# Patient Record
Sex: MALE | Race: BLACK OR AFRICAN AMERICAN | Employment: OTHER | URBAN - METROPOLITAN AREA
[De-identification: names, ages, dates, MRNs, and addresses within clinical notes are randomized per-mention and may not be internally consistent; named-entity substitution may affect disease eponyms.]

---

## 2018-12-14 ENCOUNTER — OFFICE VISIT (OUTPATIENT)
Dept: NEUROLOGY | Age: 78
End: 2018-12-14

## 2018-12-14 VITALS
TEMPERATURE: 97.8 F | OXYGEN SATURATION: 98 % | HEIGHT: 72 IN | HEART RATE: 51 BPM | RESPIRATION RATE: 16 BRPM | BODY MASS INDEX: 27.77 KG/M2 | SYSTOLIC BLOOD PRESSURE: 100 MMHG | WEIGHT: 205 LBS | DIASTOLIC BLOOD PRESSURE: 60 MMHG

## 2018-12-14 DIAGNOSIS — G47.10 HYPERSOMNIA: Primary | ICD-10-CM

## 2018-12-14 DIAGNOSIS — G47.00 INSOMNIA, UNSPECIFIED TYPE: ICD-10-CM

## 2018-12-14 NOTE — PROGRESS NOTES
HPI:  70 y/o male coming for sleep problems. For years he has felt very sleepy during the day. He has variable sleep times, varies from 10:30p-12:30a, fluctuates for a couple of hrs. He tosses and turns throughout the night. He is up in am by 9am. He does not feel rested, and he tries not to nap, but can fall asleep anywhere. He does not know if he snores. He was on namenda, reportedly for off label tx of glaucoma, because it helps with nerves. He denies probs with memory. He does not smoke, occasionally drinks. He does watch TV in bed, does not eat close to bedtime. Social History     Socioeconomic History    Marital status:      Spouse name: Not on file    Number of children: Not on file    Years of education: Not on file    Highest education level: Not on file   Social Needs    Financial resource strain: Not on file    Food insecurity - worry: Not on file    Food insecurity - inability: Not on file    Transportation needs - medical: Not on file   [employer] needs - non-medical: Not on file   Occupational History    Not on file   Tobacco Use    Smoking status: Former Smoker    Smokeless tobacco: Never Used   Substance and Sexual Activity    Alcohol use: Yes    Drug use: Not on file    Sexual activity: Not on file   Other Topics Concern    Not on file   Social History Narrative    Not on file       Family History   Problem Relation Age of Onset    Breast Cancer Mother     Cancer Father     Cancer Sister         hodgkins       Current Outpatient Medications   Medication Sig Dispense Refill    sildenafil, antihypertensive, (REVATIO) 20 mg tablet Take 1-5 tabs by mouth one hour prior to sexual activity 90 Tab 2    sildenafil, antihypertensive, (REVATIO) 20 mg tablet TAKE 1 TAB BY MOUTH AS NEEDED.  TAKE 1-5 TABS AS DIRECTED INDICATIONS: [facility] 40 Tab 0    KLOR-CON M10 10 mEq tablet TAKE 1 TABLET BY MOUTH EVERY DAY  3    tamsulosin (FLOMAX) 0.4 mg capsule Take 1 Cap by mouth daily. 90 Cap 3    tadalafil (CIALIS) 5 mg tablet Take 1 Tab by mouth daily. Indications: Erectile Dysfunction, SYMPTOMATIC BENIGN PROSTATIC HYPERPLASIA 30 Tab 6    NAMENDA XR 7 mg capsule       LUMIGAN 0.01 % ophthalmic drops       hydrochlorothiazide (HYDRODIURIL) 25 mg tablet       simvastatin (ZOCOR) 20 mg tablet       eszopiclone (LUNESTA) 3 mg tablet Take  by mouth nightly.  Cholecalciferol, Vitamin D3, (VITAMIN D3) 1,000 unit cap Take  by mouth.  b complex vitamins (B COMPLEX 1) tablet Take 1 Tab by mouth daily.  multivitamin (ONE A DAY) tablet Take 1 Tab by mouth daily.  omega-3 fatty acids-vitamin e (FISH OIL) 1,000 mg cap Take 1 Cap by mouth.  GLUCOSAM/MSM/CHOND/SAA582/HYAL (GLUCOSAMINE-CHONDROITIN-MSM PO) Take  by mouth.  Flaxseed Oil Oil by Does Not Apply route.  zinc 50 mg capsule Take  by mouth.  melatonin 3 mg tablet Take  by mouth.  SODIUM CHLORIDE by Does Not Apply route.  cycloSPORINE (RESTASIS) 0.05 % ophthalmic emulsion Administer 1 Drop to both eyes two (2) times a day.  aspirin 81 mg tablet Take 81 mg by mouth.  loteprednol etabonate (LOTEMAX) 0.5 % ophthalmic suspension Administer 1 Drop to both eyes four (4) times daily.       COMBIGAN 0.2-0.5 % drop ophthalmic solution       AZOPT 1 % ophthalmic suspension          Past Medical History:   Diagnosis Date    Arthritis     BPH (benign prostatic hyperplasia)     Hemospermia     High blood pressure     High cholesterol     Impotence of organic origin     Inguinal hernia     Insomnia     Rt groin pain     Urge incontinence        Past Surgical History:   Procedure Laterality Date    HX CATARACT REMOVAL      HX HERNIA REPAIR         No Known Allergies    Patient Active Problem List   Diagnosis Code    BPH (benign prostatic hyperplasia) N40.0    Urgency of urination R39.15    Urinary hesitancy R39.11    Hematospermia R36.1    Benign non-nodular prostatic hyperplasia with lower urinary tract symptoms N40.1    ED (erectile dysfunction) of organic origin N52.9    Right groin pain R10.31    Urge incontinence of urine N39.41    Increased frequency of urination R35.0         Review of Systems:   Constitutional: no fever or chills, reports fatigue  Skin denies rash or itching  HEENT:  Denies tinnitus, hearing loss, or visual changes  Respiratory: denies shortness of breath  Cardiovascular: denies chest pain, dyspnea on exertion  Gastrointestinal: does not report nausea or vomiting  Genitourinary: does not report dysuria or incontinence  Musculoskeletal: does not report joint pain or swelling  Endocrine: denies weight change  Hematology: denies easy bruising or bleeding   Neurological: as above in HPI      PHYSICAL EXAMINATION:      VITAL SIGNS:    Visit Vitals  /60 (BP 1 Location: Left arm, BP Patient Position: Sitting)   Pulse (!) 51   Temp 97.8 °F (36.6 °C)   Resp 16   Ht 6' (1.829 m)   Wt 93 kg (205 lb)   SpO2 98%   BMI 27.80 kg/m²       GENERAL: Well developed, well nourished, in no apparent distress. HEART: RR, no murmurs heard, no carotid bruits  EXTREMITIES: No clubbing, cyanosis, or edema is identified. Pulses 2+  and symmetrical.  HEAD:   Normocephalic, atraumatic. Mallampati IV    NEUROLOGIC EXAMINATION    MENTAL STATUS: Awake, alert, and oriented x 4. Attention and STM are grossly normal. There is no aphasia. Fund of knowledge is adequate. Mood and affect are appropriate  CRANIAL NERVES: Visual fields are full to confrontation. Unable to see fundi. Pupils are reactive to light and accommodation. Extraocular movements are intact and there is no nystagmus. Facial sensation is normal  Face is symmetrical.   Hearing is present. SCM/TPZ 5/5  Palate rises symmetrically. Tongue is in the midline. MOTOR:   The patient is 5/5 in all four limbs without any drift.      CEREBELLAR: No tremors or dysmetria    SENSORY:  Normal PP, vibration, propioception. Romberg neg    DTR's:   +2 throughout, no long tract signs     GAIT:   Normal gait        Impression: Insufficient sleep with chronic insomnia, with a high probability of obstructive sleep apnea. I think that he has erratic sleep habits contributing to his problem and he recognizes this. Plan: 1attended overnight PSG. 2Counseled pt at length about obstructive sleep apnea evaluation, treatment options, weight loss as appropriate, and consequences of untreated ASH. 3-Counseled pt about sleep hygiene, including predictable bedtimes and rise times, avoidance of late meals near bedtime, no TV in bedroom, to get out of room if unable to fall asleep after 10-15 mins, and no napping. 4-F/U after PSG    PLEASE NOTE:   Portions of this document may have been produced using voice recognition software. Unrecognized errors in transcription may be present. This note will not be viewable in [address]

## 2018-12-18 ENCOUNTER — HOSPITAL ENCOUNTER (OUTPATIENT)
Dept: PHYSICAL THERAPY | Age: 78
Discharge: HOME | End: 2018-12-18
Payer: MEDICARE

## 2018-12-18 PROCEDURE — 97530 THERAPEUTIC ACTIVITIES: CPT

## 2018-12-18 PROCEDURE — G8984 CARRY CURRENT STATUS: HCPCS

## 2018-12-18 PROCEDURE — 97161 PT EVAL LOW COMPLEX 20 MIN: CPT

## 2018-12-18 PROCEDURE — G8985 CARRY GOAL STATUS: HCPCS

## 2018-12-18 NOTE — PROGRESS NOTES
[facility] - Aura Kiser  [address] [facility]  [phone] fax    Plan of Care/ Statement of Necessity for Physical Therapy Services    Patient name: Luis Brar. Start of Care: 2018   Referral source: Kelli Craft :     Medical Diagnosis: Left elbow pain [M25.522]  Payor: [place] MEDICARE / Plan: [place] MEDICARE PART A & B / Product Type: Medicare /  Onset Date: ~4 weeks ago    Treatment Diagnosis: Left elbow pain   Prior Hospitalization: see medical history Provider#: [id number]   Medications: Verified on Patient summary List    Comorbidities: arthritis, weight change more than 10 lbs   Prior Level of Function: Right handed, ind with all mobility, working out everyday      The Plan of Care and following information is based on the information from the initial evaluation. Assessment/ cam information: Mr. Lamar Patel is a 68 y/o, M pt with CC of Left elbow pain at Triceps insertion. Pt c/o most difficulty with work out. Pt present with WFL AROM of B UEs, min decreased strength of Left elbow, mod TTP at Left Triceps insertion, and mod decreased tone of Triceps. Pt also demonstrates fair strength with parascapular muscles, fair posture with forwarded head. Pt would benefit from skilled PT to address these deficits above to improve the ability to perform ADLs and recreational activities comfortably and safely.     Evaluation Complexity History MEDIUM  Complexity : 1-2 comorbidities / personal factors will impact the outcome/ POC ; Examination MEDIUM Complexity : 3 Standardized tests and measures addressing body structure, function, activity limitation and / or participation in recreation  ;Presentation LOW Complexity : Stable, uncomplicated  ;Clinical Decision Making MEDIUM Complexity : FOTO score of 26-74  Overall Complexity Rating: LOW   Problem List: pain affecting function, decrease strength, edema affecting function, decrease ADL/ functional abilitiies, decrease activity tolerance and decrease flexibility/ joint mobility   Treatment Plan may include any combination of the following: Therapeutic exercise, Therapeutic activities, Neuromuscular re-education, Physical agent/modality, Manual therapy, Patient education, Self Care training and Functional mobility training  Patient / Family readiness to learn indicated by: asking questions, trying to perform skills and interest  Persons(s) to be included in education: patient (P)  Barriers to Learning/Limitations: None  Patient Goal (s): pain reduction  Patient Self Reported Health Status: good  Rehabilitation Potential: good    Short term goals: To be accomplished within 1 week  1. Pt will be independent with HEP to maintain progression. Long term goals: To be accomplished within 8 weeks  1. Pt will improve FOTO score by 11 points to 75/100 to show improvement with functional mobility performance. 2. Pt will report 50% improvement with overall pain/soreness to improve his QOL. 3. Pt will report No or Limited a little with returning to his work out/exercise regimen. Frequency / Duration: Patient to be seen 2-3 times per week for 8 weeks. Patient/ Caregiver education and instruction: Diagnosis, prognosis, self care, activity modification, brace/ splint application and exercises   [x]  Plan of care has been reviewed with PTA    G-Codes (GP)  Carry   Current  CJ= 20-39%    Goal  CJ= 20-39%    The severity rating is based on clinical judgment and the FOTO score. Certification Period: 12- to 2-    Ursula Cruz PT 12/18/2018 11:50 AM    ________________________________________________________________________    I certify that the above Therapy Services are being furnished while the patient is under my care. I agree with the treatment plan and certify that this therapy is necessary.     Physician's Signature:____________Date:_________TIME:________    [facility] Date and Time must be completed for valid certification **    Please sign and return to [facility] - Mono Mccartney  [address] [place] [address]  [phone] fax

## 2018-12-18 NOTE — PROGRESS NOTES
PT DAILY TREATMENT NOTE/ELBOW EVAL 10-18    Patient Name: Shanell Correa. Date:2018  : 1940  [x]  Patient  Verified  Payor: [place] MEDICARE / Plan: [place] MEDICARE PART A & B / Product Type: Medicare /    In time: 11:16  Out time: 11:48  Total Treatment Time (min): 32  Visit #: 1 of     Medicare/BCBS Only   Total Timed Codes (min):  15 1:1 Treatment Time:  32     Treatment Area: Left elbow pain [M25.522]    SUBJECTIVE  Pain Level (0-10 scale):  1/10  []constant []intermittent []improving []worsening []no change since onset    Any medication changes, allergies to medications, adverse drug reactions, diagnosis change, or new procedure performed?: [x] No    [] Yes (see summary sheet for update)  Subjective functional status/changes:     PLOF: Right handed, mod Ind,   Limitations to PLOF:   Mechanism of Injury:   Current symptoms/Complaints: Mr. Marina Breaux is a 70 y/o, M pt with CC of Left elbow pain at Triceps insertion. Pt c/o most difficulty with work out. Previous Treatment/Compliance:   PMHx/Surgical Hx:   Work Hx:   Living Situation:   Pt Goals: \"pain reduction\"  Barriers: []pain []financial []time []transportation []other  Motivation:   Substance use: []Alcohol []Tobacco []other:   FABQ Score: []low []elevate  Cognition: A & O x     Other:    OBJECTIVE/EXAMINATION  Domestic Life:   Activity/Recreational Limitations:   Mobility:   Self Care:       17 min []Eval                  []Re-Eval       15 min Therapeutic Activity:  []  See flow sheet :Pt edu within scope of practice on prognosis, POC,  modalities use, exercise mode, duration, intensity, HEP review.    Rationale: increase ROM, increase strength, improve coordination and increase proprioception  to improve the patients ability to perform ADLs with ease     With   [] TE   [] TA   [] neuro   [] other: Patient Education: [x] Review HEP    [] Progressed/Changed HEP based on:   [] positioning   [] body mechanics   [] transfers   [] heat/ice application    [] other:      Other Objective/Functional Measures:     Physical Therapy Evaluation- Elbow    Upper Extremity ROM WFL with B UEs                        [] Unable to assess at this time   WNL N/A ROM as follows:    Left Shoulder [] []     Right Shoulder [] []       WNL N/A Flex Ext Sup Pro Pain   Left Elbow []  []     [] Yes   [] No   Right Elbow [] []     [] Yes   [] No   Left Wrist [] []     [] Yes   [] No   Right Wrist [] []     [] Yes   [] No     Upper Extremity Strength: (0-5)  [] Unable to assess at this time   WNL N/A Flex Ext IR ER Abd Add   L Shoulder [] []         R Shoulder [] []            WNL N/A Flex Ext Sup Pro Pain   Left Elbow [] []     [] Yes   [] No   Right Elbow [] []     [] Yes   [] No     Flexibility:                    [] Unable to assess at this time        Pain  Common Flexor Group WNL Min Mod Severe [] Yes   [] No   (L) Tightness = [] [] [] [] [] Yes   [] No   (R) Tightness =  [] [] [] [] [] Yes   [] No   Common Extensor Group     [] Yes   [] No   (L) Tightness = [] [] [] [] [] Yes   [] No   (R) Tightness = [] [] [] [] [] Yes   [] No     Palpation  [] Min  [] Mod  [] Severe    Location:  [] Min  [] Mod  [] Severe    Location:  [] Min  [] Mod  [] Severe    Location:         Strength:  Dynamometer position 2   Right (lbs) Left (lbs) Pain/location   Elbow Flexed: (90 deg)      Elbow Extended:         Optional Tests:  Resisted Finger Test:   2nd & 3rd finger extension (ECRB or ECRL):[] Neg   [] Pos  3rd finger extension (PIN): [] Neg   [] Pos    Neural Mobility Test:    Radial Nerve: [] Neg   [] Pos    Describe:  Median Nerve: [] Neg   [] Pos    Describe:  Ulnar Nerve:  [] Neg   [] Pos    Describe:       BP: 118/71 mmHg; HR: 51 bpm   No tightness with wrist ext and flexors   Mod tenderness at Left Triceps insertion   Hypotonicity of Left Triceps     Pain Level (0-10 scale) post treatment: 1/10    ASSESSMENT/Changes in Function: see POC    Patient will continue to benefit from skilled PT services to modify and progress therapeutic interventions, address functional mobility deficits, address strength deficits, analyze and address soft tissue restrictions, analyze and cue movement patterns, analyze and modify body mechanics/ergonomics, assess and modify postural abnormalities and instruct in home and community integration to attain remaining goals.      [x]  See Plan of Care  []  See progress note/recertification  []  See Discharge Summary         Progress towards goals / Updated goals:  See POC    PLAN  [x]  Upgrade activities as tolerated     [x]  Continue plan of care  []  Update interventions per flow sheet       []  Discharge due to:_  []  Other:_    Jon Nguyễn PT 12/18/2018  11:07 AM

## 2018-12-21 ENCOUNTER — HOSPITAL ENCOUNTER (OUTPATIENT)
Dept: PHYSICAL THERAPY | Age: 78
Discharge: HOME | End: 2018-12-21
Payer: MEDICARE

## 2018-12-21 PROCEDURE — 97110 THERAPEUTIC EXERCISES: CPT

## 2018-12-21 NOTE — PROGRESS NOTES
PT DAILY TREATMENT NOTE 10-18    Patient Name: Renato Plascencia. Date:2018  : 1940  [x]  Patient  Verified  Payor: VA MEDICARE / Plan: VA MEDICARE PART A & B / Product Type: Medicare /    In time: 10:04  Out time:10:30  Total Treatment Time (min): 26  Visit #: 2 of     Medicare/BCBS Only   Total Timed Codes (min):  26 1:1 Treatment Time:  25       Treatment Area: Left elbow pain [M25.522]    SUBJECTIVE  Pain Level (0-10 scale): 1/10  Any medication changes, allergies to medications, adverse drug reactions, diagnosis change, or new procedure performed?: [x] No    [] Yes (see summary sheet for update)  Subjective functional status/changes:   [] No changes reported  Pt reported doing ok with HEP; he's not doing much though    OBJECTIVE    25 min Therapeutic Exercise:  [x] See flow sheet :   Rationale: increase ROM, increase strength, improve coordination and increase proprioception to improve the patients ability to perform ADLs with ease          With   [] TE   [] TA   [] neuro   [] other: Patient Education: [x] Review HEP    [] Progressed/Changed HEP based on:   [] positioning   [] body mechanics   [] transfers   [] heat/ice application    [] other:      Other Objective/Functional Measures:    Good form with therex, min A for keeping count    No significant pain with active elbow & shoulder ext with weight, soreness after 15 reps    Pain Level (0-10 scale) post treatment: 0/10    ASSESSMENT/Changes in Function: Initiated treatment as POC, pt demonstrated good tolerance and motivation. Will progress therex as tolerated next visit.      Patient will continue to benefit from skilled PT services to modify and progress therapeutic interventions, address functional mobility deficits, address ROM deficits, address strength deficits, analyze and address soft tissue restrictions, analyze and cue movement patterns, analyze and modify body mechanics/ergonomics, assess and modify postural abnormalities and instruct in home and community integration to attain remaining goals. [x]  See Plan of Care  []  See progress note/recertification  []  See Discharge Summary         Progress towards goals / Updated goals:  Short term goals: To be accomplished within 1 week  1. Pt will be independent with HEP to maintain progression.     Long term goals: To be accomplished within 8 weeks  1. Pt will improve FOTO score by 11 points to 75/100 to show improvement with functional mobility performance. 2. Pt will report 50% improvement with overall pain/soreness to improve his QOL. 3. Pt will report No or Limited a little with returning to his work out/exercise regimen.     PLAN  []  Upgrade activities as tolerated     []  Continue plan of care  []  Update interventions per flow sheet       []  Discharge due to:_  []  Other:_      Nishi Friend, PT 12/21/2018  7:56 AM    Future Appointments   Date Time Provider Narciso Vilchis   12/21/2018 10:00 AM Victor Hugo Pearson [id number] [facility]   12/27/2018 10:00 AM Nina Beranrd PTA [id number] [facility]   1/2/2019 10:00 AM Malou Blank, PT [id number] [facility]   1/4/2019 11:00 AM Victor Hugo ARELLANOBT [facility]   1/21/2019  8:30 PM [facility] SLEEP RM 2 MMCSL [facility]   2/11/2019  1:45 PM Silvio Hahn  [address]   4/24/2019  9:50 AM [facility] NURSE [facility] CARLOS SHARMA   5/8/2019 10:30 AM Justus Jimenez MD [id number] [facility]

## 2018-12-27 ENCOUNTER — HOSPITAL ENCOUNTER (OUTPATIENT)
Dept: PHYSICAL THERAPY | Age: 78
Discharge: HOME | End: 2018-12-27
Payer: MEDICARE

## 2018-12-27 PROCEDURE — 97110 THERAPEUTIC EXERCISES: CPT

## 2018-12-27 NOTE — PROGRESS NOTES
PT DAILY TREATMENT NOTE 10-18    Patient Name: Navin Maldonado. Date:2018  : 1940  [x]  Patient  Verified  Payor: Radha Humphrey / Plan: [place] MEDICARE PART A & B / Product Type: Medicare /    In time:1010  Out time:1040  Total Treatment Time (min): 30  Visit #: 3 of     Medicare/BCBS Only   Total Timed Codes (min):  30 1:1 Treatment Time:  30       Treatment Area: Left elbow pain [M25.522]    SUBJECTIVE  Pain Level (0-10 scale): 1/10  Any medication changes, allergies to medications, adverse drug reactions, diagnosis change, or new procedure performed?: [x] No    [] Yes (see summary sheet for update)  Subjective functional status/changes:   [] No changes reported  Pt stated that his pain is about the same    OBJECTIVE    30 min Therapeutic Exercise:  [x] See flow sheet :   Rationale: increase ROM and increase strength to improve the patients ability to increase ease with ADLs    With   [x] TE   [] TA   [] neuro   [] other: Patient Education: [x] Review HEP    [] Progressed/Changed HEP based on:   [] positioning   [] body mechanics   [] transfers   [] heat/ice application    [] other:      Other Objective/Functional Measures:   Was challenged with web exercise  No complaint of increased pain during session  Needed cueing to slow down with exercises    Pain Level (0-10 scale) post treatment: 0/10    ASSESSMENT/Changes in Function:   Pt is progressing slowly toward goals. Pt cont to have difficulty with overhead activities. Pt cont to work out at home and reports increased ease with some of the exercises    Patient will continue to benefit from skilled PT services to modify and progress therapeutic interventions, address functional mobility deficits, address ROM deficits and address strength deficits to attain remaining goals.      [x]  See Plan of Care  []  See progress note/recertification  []  See Discharge Summary         Progress towards goals / Updated goals:  Short term goals: To be accomplished within 1 week  1. Pt will be independent with HEP to maintain progression.     Long term goals: To be accomplished within 8 weeks  1. Pt will improve FOTO score by 11 points to 75/100 to show improvement with functional mobility performance. 2. Pt will report 50% improvement with overall pain/soreness to improve his QOL. 3. Pt will report No or Limited a little with returning to his work out/exercise regimen.     PLAN  []  Upgrade activities as tolerated     [x]  Continue plan of care  []  Update interventions per flow sheet       []  Discharge due to:_  []  Other:_      Curtis Cervantes, PTA 12/27/2018  10:12 AM    Future Appointments   Date Time Provider Narciso Mame   1/2/2019 10:00 AM Stephani Ayala, PT [id number] [facility]   1/4/2019 11:00 AM Jaguar Cordero [id number] [facility]   1/21/2019  8:30 PM [address] 2 MMCSL [facility]   2/11/2019  1:45 PM Manuel Vega MD [address]   4/24/2019  9:50 AM [facility] NURSE [facility] CARLOS SCHED   5/8/2019 10:30 AM Stacey Horowitz MD [address]

## 2019-01-02 ENCOUNTER — HOSPITAL ENCOUNTER (OUTPATIENT)
Dept: PHYSICAL THERAPY | Age: 79
Discharge: HOME | End: 2019-01-02
Payer: MEDICARE

## 2019-01-02 PROCEDURE — 97110 THERAPEUTIC EXERCISES: CPT

## 2019-01-02 NOTE — PROGRESS NOTES
PT DAILY TREATMENT NOTE 10-18    Patient Name: Bandar Alamo. Date:2019  : 1940  [x]  Patient  Verified  Payor: VA MEDICARE / Plan: VA MEDICARE PART A & B / Product Type: Medicare /    In time:1000  Out time:1030  Total Treatment Time (min): 30  Visit #: 4 of     Medicare/BCBS Only   Total Timed Codes (min):  30 1:1 Treatment Time:  30       Treatment Area: Left elbow pain [M25.522]    SUBJECTIVE  Pain Level (0-10 scale): .5/10  Any medication changes, allergies to medications, adverse drug reactions, diagnosis change, or new procedure performed?: [x] No    [] Yes (see summary sheet for update)  Subjective functional status/changes:   [] No changes reported  Pt reports his elbow pain has greatly improved since starting therapy. OBJECTIVE    30 min Therapeutic Exercise:  [x] See flow sheet :   Rationale: increase ROM and increase strength to improve the patients ability to increase ease with ADLs    With   [x] TE   [] TA   [] neuro   [] other: Patient Education: [x] Review HEP    [] Progressed/Changed HEP based on:   [] positioning   [] body mechanics   [] transfers   [] heat/ice application    [] other:      Other Objective/Functional Measures:   Increased resistance for rows/IR/ER       Pain Level (0-10 scale) post treatment: .5 / 10 soreness    ASSESSMENT/Changes in Function: Progressed Rx per POC without increase in left elbow pain. Patient reports moderate compliance with HEP, attempting to complete once daily. Will continue to address triceps strength and ROM in order to restore full participation in exercise regime. Patient will continue to benefit from skilled PT services to modify and progress therapeutic interventions, address functional mobility deficits, address ROM deficits and address strength deficits to attain remaining goals.      []  See Plan of Care  []  See progress note/recertification  []  See Discharge Summary         Progress towards goals / Updated goals:  Short term goals: To be accomplished within 1 week  1. Pt will be independent with HEP to maintain progression. Moderate compliance 1/2     Long term goals: To be accomplished within 8 weeks  1. Pt will improve FOTO score by 11 points to 75/100 to show improvement with functional mobility performance. 2. Pt will report 50% improvement with overall pain/soreness to improve his QOL. 3. Pt will report No or Limited a little with returning to his work out/exercise regimen.     PLAN  [x]  Upgrade activities as tolerated     [x]  Continue plan of care  []  Update interventions per flow sheet       []  Discharge due to:_  []  Other:_      Aguila Nagy PT 1/2/2019  10:12 AM    Future Appointments   Date Time Provider Narciso Vilchis   1/2/2019 10:00 AM Mira Hampton PT [facility]   1/4/2019 11:00 AM Kathy Linares PTA [facility]   1/21/2019  8:30 PM [address] [place] [address] 2 [facility]   2/11/2019  1:45 PM Tano Parr MD [address]   4/24/2019  9:50 AM [facility] NURSE [facility] CARLOS [facility]   5/8/2019 10:30 AM MD Madhuri Asher

## 2019-01-04 ENCOUNTER — HOSPITAL ENCOUNTER (OUTPATIENT)
Dept: PHYSICAL THERAPY | Age: 79
Discharge: HOME | End: 2019-01-04
Payer: MEDICARE

## 2019-01-04 PROCEDURE — 97110 THERAPEUTIC EXERCISES: CPT

## 2019-01-04 NOTE — PROGRESS NOTES
PT DAILY TREATMENT NOTE 10-18    Patient Name: Mary Carrasco. Date:2019  : 1940  [x]  Patient  Verified  Payor: VA MEDICARE / Plan: VA MEDICARE PART A & B / Product Type: Medicare /    In time:11:08  Out time:11:44  Total Treatment Time (min): 36  Visit #: 5 of -    Medicare/BCBS Only   Total Timed Codes (min):  36 1:1 Treatment Time:  30       Treatment Area: Left elbow pain [M25.522]    SUBJECTIVE  Pain Level (0-10 scale): 0/10  Any medication changes, allergies to medications, adverse drug reactions, diagnosis change, or new procedure performed?: [x] No    [] Yes (see summary sheet for update)  Subjective functional status/changes:   [] No changes reported  Pt reports being compliant with his HEP. OBJECTIVE      36 min Therapeutic Exercise:  [x] See flow sheet :   Rationale: increase ROM and increase strength to improve the patients ability to perform ADL's without pain. With   [x] TE   [] TA   [] neuro   [] other: Patient Education: [x] Review HEP    [] Progressed/Changed HEP based on:   [] positioning   [] body mechanics   [] transfers   [] heat/ice application    [] other:      Other Objective/Functional Measures: Performed TE's per flow sheet without pain. .  Progressed to [employer]. Pain Level (0-10 scale) post treatment: 0/10       ASSESSMENT/Changes in Function: Pt continued TE's requiring verbal and tactile cues. Pt achieved LTG#2 reporting he has had an overall 90% improvement with pain/soreness since beginning therapy. Patient will continue to benefit from skilled PT services to modify and progress therapeutic interventions, address functional mobility deficits, address ROM deficits, address strength deficits, analyze and address soft tissue restrictions and analyze and cue movement patterns to attain remaining goals.      [x]  See Plan of Care  []  See progress note/recertification  []  See Discharge Summary         Progress towards goals / Updated goals:  Short term goals: To be accomplished within 1 week  1. Pt will be independent with HEP to maintain progression. Moderate compliance 1/2     Long term goals: To be accomplished within 8 weeks  1. Pt will improve FOTO score by 11 points to 75/100 to show improvement with functional mobility performance. 2. Pt will report 50% improvement with overall pain/soreness to improve his QOL MET 1/4/19.   3. Pt will report No or Limited a little with returning to his work out/exercise regimen.     PLAN  [x]  Upgrade activities as tolerated     [x]  Continue plan of care  [x]  Update interventions per flow sheet       []  Discharge due to:_  []  Other:_      Relda Miki, SHARDA 1/4/2019  11:22 AM    Future Appointments   Date Time Provider Narciso Vilchis   1/10/2019  9:30 AM JOE Alvarez [facility] CARLOS SCHED   1/21/2019  8:30 PM [address] [facility] 2 [facility]   2/11/2019  1:45 PM Obdulio Weinstein MD [address]   4/24/2019  9:50 AM [facility] NURSE [facility] CARLOS SCHED   5/8/2019 10:30 AM [facility]

## 2019-01-08 ENCOUNTER — APPOINTMENT (OUTPATIENT)
Dept: PHYSICAL THERAPY | Age: 79
End: 2019-01-08
Payer: MEDICARE

## 2019-01-09 ENCOUNTER — HOSPITAL ENCOUNTER (OUTPATIENT)
Dept: PHYSICAL THERAPY | Age: 79
Discharge: HOME | End: 2019-01-09
Payer: MEDICARE

## 2019-01-09 PROCEDURE — 97110 THERAPEUTIC EXERCISES: CPT

## 2019-01-09 NOTE — PROGRESS NOTES
PT DAILY TREATMENT NOTE 10-18    Patient Name: Norm Ch. Date:2019  : 1940  [x]  Patient  Verified  Payor: VA MEDICARE / Plan: VA MEDICARE PART A & B / Product Type: Medicare /    In time:135  Out time:200  Total Treatment Time (min): 25  Visit #: 6 of     Medicare/BCBS Only   Total Timed Codes (min):  25 1:1 Treatment Time:  25       Treatment Area: Left elbow pain [M25.522]    SUBJECTIVE  Pain Level (0-10 scale): 0/10  Any medication changes, allergies to medications, adverse drug reactions, diagnosis change, or new procedure performed?: [x] No    [] Yes (see summary sheet for update)  Subjective functional status/changes:   [] No changes reported  Pt stated that he is doing well today and has no pain    OBJECTIVE    25 min Therapeutic Exercise:  [x] See flow sheet :   Rationale: increase ROM and increase strength to improve the patients ability to increase ease with ADLs    With   [x] TE   [] TA   [] neuro   [] other: Patient Education: [x] Review HEP    [] Progressed/Changed HEP based on:   [] positioning   [] body mechanics   [] transfers   [] heat/ice application    [] other:      Other Objective/Functional Measures:   Had no difficulty with exercises  Needed cueing to slow down with exercises    Pain Level (0-10 scale) post treatment: 0.5/10    ASSESSMENT/Changes in Function:   Pt is progressing slowly toward goals. Pt cont with very mild pain in the left elbow. Pt cont to report improved ability to perform ADLs. Patient will continue to benefit from skilled PT services to modify and progress therapeutic interventions, address functional mobility deficits, address ROM deficits and address strength deficits to attain remaining goals. [x]  See Plan of Care  []  See progress note/recertification  []  See Discharge Summary         Progress towards goals / Updated goals:  Short term goals: To be accomplished within 1 week  1.  Pt will be independent with HEP to maintain progression. Moderate compliance 1/2     Long term goals: To be accomplished within 8 weeks  1. Pt will improve FOTO score by 11 points to 75/100 to show improvement with functional mobility performance. 2. Pt will report 50% improvement with overall pain/soreness to improve his QOL MET 1/4/19. 3. Pt will report No or Limited a little with returning to his work out/exercise regimen.     PLAN  []  Upgrade activities as tolerated     [x]  Continue plan of care  []  Update interventions per flow sheet       []  Discharge due to:_  []  Other:_      Dakota Palacios PTA 1/9/2019  1:36 PM    Future Appointments   Date Time Provider Narciso Mame   1/10/2019  9:30 AM JOE Anne [address]   1/11/2019  1:30 PM Michael Cooper PTA [facility]   1/21/2019  8:30 PM [address] [facility] 2 [facility]   2/11/2019  1:45 PM Chloe Hunter MD [address]   4/24/2019  9:50 AM [facility] NURSE [facility] CARLOS [facility]   5/8/2019 10:30 AM Keiry Fox MD [address]

## 2019-01-11 ENCOUNTER — HOSPITAL ENCOUNTER (OUTPATIENT)
Dept: PHYSICAL THERAPY | Age: 79
Discharge: HOME | End: 2019-01-11
Payer: MEDICARE

## 2019-01-11 PROCEDURE — 97110 THERAPEUTIC EXERCISES: CPT

## 2019-01-11 NOTE — PROGRESS NOTES
PT DAILY TREATMENT NOTE 10-18    Patient Name: Seth Smith. Date:2019  : 1940  [x]  Patient  Verified  Payor: VA MEDICARE / Plan: VA MEDICARE PART A & B / Product Type: Medicare /    In time:130  Out time:201  Total Treatment Time (min): 31  Visit #: 7 of     Medicare/BCBS Only   Total Timed Codes (min):  31 1:1 Treatment Time:  31       Treatment Area: Left elbow pain [M25.522]    SUBJECTIVE  Pain Level (0-10 scale): 1/10  Any medication changes, allergies to medications, adverse drug reactions, diagnosis change, or new procedure performed?: [x] No    [] Yes (see summary sheet for update)  Subjective functional status/changes:   [] No changes reported  Pt stated that he is doing all right today    OBJECTIVE    31 min Therapeutic Exercise:  [x] See flow sheet :   Rationale: increase ROM and increase strength to improve the patients ability to increase ease with ADLs    With   [x] TE   [] TA   [] neuro   [] other: Patient Education: [x] Review HEP    [] Progressed/Changed HEP based on:   [] positioning   [] body mechanics   [] transfers   [] heat/ice application    [] other:      Other Objective/Functional Measures:   Was challenged with increases made today  No complaint of increased pain during session  Had no pain after session     Pain Level (0-10 scale) post treatment: 0/10    ASSESSMENT/Changes in Function:   Pt is slowly progressing toward goals. Pt reported increased ease with ADLs. Cont with decreased strength in left elbow    Patient will continue to benefit from skilled PT services to modify and progress therapeutic interventions, address functional mobility deficits, address ROM deficits and address strength deficits to attain remaining goals. [x]  See Plan of Care  []  See progress note/recertification  []  See Discharge Summary         Progress towards goals / Updated goals:  Short term goals: To be accomplished within 1 week  1.  Pt will be independent with HEP to maintain progression. Moderate compliance 1/2     Long term goals: To be accomplished within 8 weeks  1. Pt will improve FOTO score by 11 points to 75/100 to show improvement with functional mobility performance. 2. Pt will report 50% improvement with overall pain/soreness to improve his QOL MET 1/4/19. 3. Pt will report No or Limited a little with returning to his work out/exercise regimen.     PLAN  []  Upgrade activities as tolerated     [x]  Continue plan of care  []  Update interventions per flow sheet       []  Discharge due to:_  []  Other:_      Richardson Alamo PTA 1/11/2019  1:30 PM    Future Appointments   Date Time Provider Narciso Vilchis   1/21/2019  8:30 PM [address]2 [facility] 2 [facility]   2/11/2019  1:45 PM Jane Mayer MD [address]   4/24/2019  9:50 AM [facility] NURSE [facility] CARLOS SHARMA   5/8/2019 10:30 AM Maricel Katz MD [address] [facility]

## 2019-01-16 ENCOUNTER — HOSPITAL ENCOUNTER (OUTPATIENT)
Dept: PHYSICAL THERAPY | Age: 79
Discharge: HOME | End: 2019-01-16
Payer: MEDICARE

## 2019-01-16 PROCEDURE — 97110 THERAPEUTIC EXERCISES: CPT

## 2019-01-16 NOTE — PROGRESS NOTES
[facility] [place] [employer]  [address] [facility]  [phone] fax    Medicare Progress Report    Patient name: Oli Murray. Start of Care: 12/18/2018   Referral source: Toni Donis,* QKB: [id number]               Medical Diagnosis: Left elbow pain [M25.522]  Payor: Nydia Waldrop / Plan: VA MEDICARE PART A & B / Product Type: Medicare /  Onset Date: ~4 weeks ago               Treatment Diagnosis: Left elbow pain   Prior Hospitalization: see medical history Provider#: [id number]   Medications: Verified on Patient summary List    Comorbidities: arthritis, weight change more than 10 lbs   Prior Level of Function: Right handed, ind with all mobility, working out everyday    Visits from Start of Care: 8    Missed Visits: 0    Reporting Period: 12- to 1-    Subjective Reports: pt reports improve about 85-95%    Short term goals: To be accomplished within 1 week  1. Pt will be independent with HEP to maintain progression. Moderate compliance 1/2     Long term goals: To be accomplished within 8 weeks  1. Pt will improve FOTO score by 11 points to 75/100 to show improvement with functional mobility performance. Nearly Met, 74/100  2. Pt will report 50% improvement with overall pain/soreness to improve his QOL MET 1/4/19. 3. Pt will report No or Limited a little with returning to his work out/exercise regimen. Not met 1-16-18    Key functional changes: improving pain and strength, improving ease with ADLs      Problems/ barriers to goal attainment: persistent, min pain at night, fair shoulder girdle strength     Assessment / Recommendations:Pt making good progress, reports about 85-95% improvement overall. Pt demonstrates good form with therex and improving strength of elbow. However, he cont to experience pain at Left triceps insertion and also reports pain along left arm in the pattern of RTC. Pain's worst at night mostly.  Pt would cont to benefit from skilled PT to address remained limitations for safe and comfortable ADLs/recreational activities performance. Pt also mentions MD would like to try a procedure which pt is uncertain of its name but stated \"sonic something. \" Please give further instruction considering any specific protocol; thank you very much. Problem List: pain affecting function, decrease strength, edema affecting function, decrease ADL/ functional abilitiies, decrease activity tolerance and decrease flexibility/ joint mobility   Treatment Plan: Therapeutic exercise, Therapeutic activities, Neuromuscular re-education, Physical agent/modality, Manual therapy, Patient education, Self Care training, Functional mobility training and Home safety training    Patient Goal (s) has been updated and includes: \"get back to my work out\"     Updated Goals to be accomplished in 16 treatments:  1. Pt will report No or Limited a little with returning to his work out/exercise regimen. 2. Pt will report at least 75% improvement with pain during night so he can sleep with ease. 3. Pt will be independent with HEP to improve ease with ADLs/recreationa activities.     Frequency / Duration: Patient to be seen 2 times per week for 16 treatments:      Gerry Benites, PT 1/16/2019 4:36 PM

## 2019-01-16 NOTE — PROGRESS NOTES
PT DAILY TREATMENT NOTE 10-18    Patient Name: Aftab Altamirano. Date:2019  : 1940  [x]  Patient  Verified  Payor: Renny Frankel / Plan: VA MEDICARE PART A & B / Product Type: Medicare /    In time: 3:06  Out time: 3:43  Total Treatment Time (min): 37  Visit #: 8 of     Medicare/BCBS Only   Total Timed Codes (min):  30 1:1 Treatment Time:  37       Treatment Area: Left elbow pain [M25.522]    SUBJECTIVE  Pain Level (0-10 scale): 0.5-1/10  Any medication changes, allergies to medications, adverse drug reactions, diagnosis change, or new procedure performed?: [] No    [x] Yes (see summary sheet for update)  Subjective functional status/changes:   [] No changes reported  Pt reports 85-95% improvement overall. Pt saw MD on Mon and recalls that MD would like him to get some procedure (\"sonic. ..)    OBJECTIVE    30 min Therapeutic Exercise:  [x] See flow sheet :   Rationale: increase ROM and increase strength to improve the patients ability to increase ease with ADLs     7 min assistance for FOTO, no charge          With   [] TE   [] TA   [] neuro   [] other: Patient Education: [x] Review HEP    [] Progressed/Changed HEP based on:   [] positioning   [] body mechanics   [] transfers   [] heat/ice application    [] other:      Other Objective/Functional Measures:    Min pain with elbow ext   Mod challenged with ER using KZ, serratus punch and triceps ecc in supine   Pain Level (0-10 scale) post treatment: 0/10    ASSESSMENT/Changes in Function: See progress note/recertification    Patient will continue to benefit from skilled PT services to modify and progress therapeutic interventions, address functional mobility deficits, address ROM deficits and address strength deficits to attain remaining goals. []  See Plan of Care  [x]  See progress note/recertification  []  See Discharge Summary         Progress towards goals / Updated goals:  Short term goals: To be accomplished within 1 week  1.  Pt will be independent with HEP to maintain progression. Moderate compliance 1/2     Long term goals: To be accomplished within 8 weeks  1. Pt will improve FOTO score by 11 points to 75/100 to show improvement with functional mobility performance. 2. Pt will report 50% improvement with overall pain/soreness to improve his QOL MET 1/4/19. 3. Pt will report No or Limited a little with returning to his work out/exercise regimen.  Not met 1-16-18     PLAN  []  Upgrade activities as tolerated     [x]  Continue plan of care  []  Update interventions per flow sheet       []  Discharge due to:_  []  Other:_    Jessie Henderson, PT 1/16/2019  7:52 AM    Future Appointments   Date Time Provider Narciso Vilchis   1/16/2019  3:00 PM Rafita CARRILLO [facility]   1/21/2019  8:30 PM [facility] 2 [facility]   2/11/2019  1:45 PM Jeannie Lindo  [address]    4/24/2019  9:50 AM [facility] NURSE [facility] CARLOS [facility]   5/8/2019 10:30 AM [facility]

## 2019-01-18 ENCOUNTER — HOSPITAL ENCOUNTER (OUTPATIENT)
Dept: PHYSICAL THERAPY | Age: 79
Discharge: HOME | End: 2019-01-18
Payer: MEDICARE

## 2019-01-18 PROCEDURE — 97110 THERAPEUTIC EXERCISES: CPT

## 2019-01-18 NOTE — PROGRESS NOTES
PT DAILY TREATMENT NOTE 10-18    Patient Name: Israel Norton. Date:2019  : 1940  [x]  Patient  Verified  Payor: [employer] / Plan: VA MEDICARE PART A & B / Product Type: Medicare /    In time: 4:07  Out time: 4:45  Total Treatment Time (min): 38  Visit #: 9 of -    Medicare/BCBS Only   Total Timed Codes (min):  38 1:1 Treatment Time:  38       Treatment Area: Left elbow pain [M25.522]    SUBJECTIVE  Pain Level (0-10 scale): 1/10  Any medication changes, allergies to medications, adverse drug reactions, diagnosis change, or new procedure performed?: [x] No    [] Yes (see summary sheet for update)  Subjective functional status/changes:   [] No changes reported  Pt reports doing ok    OBJECTIVE     38 min Therapeutic Exercise:  [x] See flow sheet :   Rationale: increase ROM and increase strength to improve the patients ability to increase ease with ADLs           With   [] TE   [] TA   [] neuro   [] other: Patient Education: [x] Review HEP    [] Progressed/Changed HEP based on:   [] positioning   [] body mechanics   [] transfers   [] heat/ice application    [] other:      Other Objective/Functional Measures:    Pt was 7 min late   Tolerated web for  strength therex    Improved form with therex but con to reports min soreness      Pain Level (0-10 scale) post treatment: 0/10    ASSESSMENT/Changes in Function: pt making steady progress, demonstrates improved form with therex. Will cont to progress therex to stabilize shoulder girdle/UE for safe and comfortable ADLs/recreational activities. Patient will continue to benefit from skilled PT services to modify and progress therapeutic interventions, address functional mobility deficits, address ROM deficits and address strength deficits to attain remaining goals.     []  See Plan of Care  [x]  See progress note/recertification  []  See Discharge Summary     Updated Goals to be accomplished in 16 treatments:  1.  Pt will report No or Limited a little with returning to his work out/exercise regimen. 2. Pt will report at least 75% improvement with pain during night so he can sleep with ease. 3. Pt will be independent with HEP to improve ease with ADLs/recreationa activities.     PLAN  []  Upgrade activities as tolerated     [x]  Continue plan of care  []  Update interventions per flow sheet       []  Discharge due to:_  []  Other:_    Hien Stewart, PT 1/18/2019  9:12 AM    Future Appointments   Date Time Provider Narciso Vilchis   1/18/2019  4:00 PM Cyndee Govea [facility]   1/21/2019  8:30 PM [facility] SLEEP RM 2 [facility]   1/23/2019  3:00 PM Verner Distad, PTA [facility]   1/25/2019  3:00 PM Verner Distad, PTA [facility]   1/30/2019  3:00 PM Verner Distad, PTA [facility]   2/1/2019  3:00 PM Verner Distad, PTA [facility]   2/11/2019  1:45 PM Michelle Ty MD [address]   4/24/2019  9:50 AM [facility] NURSE [address] [place] [address]   5/8/2019 10:30 AM Timmy Burris MD [address] [place] [address]

## 2019-01-21 ENCOUNTER — HOSPITAL ENCOUNTER (OUTPATIENT)
Dept: SLEEP MEDICINE | Age: 79
Discharge: HOME | End: 2019-01-21
Payer: MEDICARE

## 2019-01-21 DIAGNOSIS — G47.10 HYPERSOMNIA: ICD-10-CM

## 2019-01-21 PROCEDURE — 95810 POLYSOM 6/> YRS 4/> PARAM: CPT

## 2019-01-23 ENCOUNTER — HOSPITAL ENCOUNTER (OUTPATIENT)
Dept: PHYSICAL THERAPY | Age: 79
Discharge: HOME | End: 2019-01-23
Payer: MEDICARE

## 2019-01-23 PROCEDURE — 97110 THERAPEUTIC EXERCISES: CPT

## 2019-01-23 NOTE — PROGRESS NOTES
PT DAILY TREATMENT NOTE 10-18    Patient Name: Celestina Castro. Date:2019  : 1940  [x]  Patient  Verified  Payor: VA MEDICARE / Plan: VA MEDICARE PART A & B / Product Type: Medicare /    In time:310  Out time:335  Total Treatment Time (min): 25  Visit #: 10 of -    Medicare/BCBS Only   Total Timed Codes (min):  25 1:1 Treatment Time:  25       Treatment Area: Left elbow pain [M25.522]    SUBJECTIVE  Pain Level (0-10 scale): 0-1/10  Any medication changes, allergies to medications, adverse drug reactions, diagnosis change, or new procedure performed?: [x] No    [] Yes (see summary sheet for update)  Subjective functional status/changes:   [] No changes reported  Pt stated that he is doing pretty good today    OBJECTIVE    25 min Therapeutic Exercise:  [x] See flow sheet :   Rationale: increase ROM and increase strength to improve the patients ability to increase ease with ADls    With   [x] TE   [] TA   [] neuro   [] other: Patient Education: [x] Review HEP    [] Progressed/Changed HEP based on:   [] positioning   [] body mechanics   [] transfers   [] heat/ice application    [] other:      Other Objective/Functional Measures:   Pt was 10 minutes late for session   No complaint of increased pain during session  Pt does not count reps    Pain Level (0-10 scale) post treatment: 0/10    ASSESSMENT/Changes in Function:   Pt is progressing well toward goals. Pt cont with mild pain in the left elbow and triceps. Cont to have difficulty with sleep    Patient will continue to benefit from skilled PT services to modify and progress therapeutic interventions, address functional mobility deficits, address ROM deficits and address strength deficits to attain remaining goals. []  See Plan of Care  [x]  See progress note/recertification  []  See Discharge Summary         Progress towards goals / Updated goals:  1. Pt will report No or Limited a little with returning to his work out/exercise regimen.   2. Pt will report at least 75% improvement with pain during night so he can sleep with ease. 3. Pt will be independent with HEP to improve ease with ADLs/recreationa activities.     PLAN  []  Upgrade activities as tolerated     [x]  Continue plan of care  []  Update interventions per flow sheet       []  Discharge due to:_  []  Other:_      Gisel Mcdermott PTA 1/23/2019  3:12 PM    Future Appointments   Date Time Provider Narciso Mame   1/25/2019  3:00 PM Marshal Taylor [facility] [address]   1/30/2019  3:00 PM Zhang Almanzar PTA [facility] [address]   2/1/2019  3:00 PM Zhang Almanzar PTA [facility] [address]   2/11/2019  1:45 PM Beni Angel MD [address]   4/24/2019  9:50 AM [facility] NURSE Madhuri   5/8/2019 10:30 AM MD Madhuri Rodriguez

## 2019-01-25 ENCOUNTER — APPOINTMENT (OUTPATIENT)
Dept: PHYSICAL THERAPY | Age: 79
End: 2019-01-25
Payer: MEDICARE

## 2019-01-30 ENCOUNTER — HOSPITAL ENCOUNTER (OUTPATIENT)
Dept: PHYSICAL THERAPY | Age: 79
Discharge: HOME | End: 2019-01-30
Payer: MEDICARE

## 2019-01-30 PROCEDURE — 97110 THERAPEUTIC EXERCISES: CPT

## 2019-01-30 NOTE — PROGRESS NOTES
PT DAILY TREATMENT NOTE 10-18    Patient Name: Brenda Decker. Date:2019  : 1940  [x]  Patient  Verified  Payor: VA MEDICARE / Plan: VA MEDICARE PART A & B / Product Type: Medicare /    In time: 3:08  Out time: 3:35  Total Treatment Time (min): 27  Visit #: 11 of -    Medicare/BCBS Only   Total Timed Codes (min):  27 1:1 Treatment Time:  25       Treatment Area: Left elbow pain [M25.522]    SUBJECTIVE  Pain Level (0-10 scale): 110 shoulder/ 0.5/10 elbow  Any medication changes, allergies to medications, adverse drug reactions, diagnosis change, or new procedure performed?: [x] No    [] Yes (see summary sheet for update)  Subjective functional status/changes:   [] No changes reported  Pt reports the elbow isn't what keeps him from sleeping at night. He notes overall improvement and wants to start he 45 minute senior [employer] sitting exercise DVD that he had been doing for the past year again.      OBJECTIVE    27 min Therapeutic Exercise:  [x] See flow sheet :   Rationale: increase ROM and increase strength to improve the patients ability to return to independent strengthening with home DVD       With   [] TE   [] TA   [] neuro   [] other: Patient Education: [x] Review HEP    [] Progressed/Changed HEP based on:   [] positioning   [] body mechanics   [] transfers   [] heat/ice application    [] other:      Other Objective/Functional Measures:   Asked pt to demonstrate exercises he was performing with home DVD; observed pt performing arm exercises snapping elbow into extension bilaterally  Educated pt on slow controlled motions for exercises and okay to return to using DVD without snapping elbows into extension  Will update HEP for pt to use at home next visit with dumbbells  Educated pt on ways to roll tricep using foam roll or tennis ball    Pain Level (0-10 scale) post treatment: 0/10    ASSESSMENT/Changes in Function: Pt is overall with reduction of pain since starting therapy and holding on his home DVD strengthening exercises. Expect pain to have onset due to snapping elbows into extension during all exercises of workout DVD upon observation of pt demonstrating exercises. Will update Final HEP to be provided pt next session and progress towards D/C with pt returning to using DVD with correct form and no increase in elbow pain. Progress towards goals / Updated goals:  1. Pt will report No or Limited a little with returning to his work out/exercise regimen. 2. Pt will report at least 75% improvement with pain during night so he can sleep with ease. 3. Pt will be independent with HEP to improve ease with ADLs/recreationa activities.     PLAN  [x]  Upgrade activities as tolerated     [x]  Continue plan of care  []  Update interventions per flow sheet       []  Discharge due to:_  []  Other:_      Mulugeta Sanford, SHARDA 2019  5:57 PM    Future Appointments   Date Time Provider Narciso Vilchis   2019  3:00 PM Bang [address]   2019  1:45 PM Josh Ely  E [facility]   2019  9:50 AM [facility] NURSE [facility] CARLOS SCHED   2019 10:30 AM Kathrine Vega MD [address] [facility]

## 2019-02-01 ENCOUNTER — HOSPITAL ENCOUNTER (OUTPATIENT)
Dept: PHYSICAL THERAPY | Age: 79
Discharge: HOME | End: 2019-02-01
Payer: MEDICARE

## 2019-02-01 PROCEDURE — 97110 THERAPEUTIC EXERCISES: CPT

## 2019-02-01 NOTE — PROGRESS NOTES
PT DAILY TREATMENT NOTE 10-18    Patient Name: Joy Portillo. Date:2019  : 1940  [x]  Patient  Verified  Payor: VA MEDICARE / Plan: VA MEDICARE PART A & B / Product Type: Medicare /    In time:310  Out time:333  Total Treatment Time (min): 23  Visit #: 12 of -    Medicare/BCBS Only   Total Timed Codes (min):  23 1:1 Treatment Time:  23       Treatment Area: Left elbow pain [M25.522]    SUBJECTIVE  Pain Level (0-10 scale): 1/10  Any medication changes, allergies to medications, adverse drug reactions, diagnosis change, or new procedure performed?: [x] No    [] Yes (see summary sheet for update)  Subjective functional status/changes:   [] No changes reported  Pt stated that he has minimal elbow pain    OBJECTIVE    23 min Therapeutic Exercise:  [x] See flow sheet :   Rationale: increase ROM and increase strength to improve the patients ability to increase ease with ADls    With   [x] TE   [] TA   [] neuro   [] other: Patient Education: [x] Review HEP    [x] Progressed/Changed HEP based on:   [] positioning   [] body mechanics   [] transfers   [] heat/ice application    [] other:      Other Objective/Functional Measures:   Updated HEP was reviewed today  No complaint of increased pain  No difficulty with exercises  Showed understanding of final HEP     Pain Level (0-10 scale) post treatment: 0/10    ASSESSMENT/Changes in Function:   Pt was issued final HEP today. Showed understanding of final HEP. Pt is scheduled for 2x/wk for 2 weeks, but will D/C when comfortable with HEP    Patient will continue to benefit from skilled PT services to modify and progress therapeutic interventions, address functional mobility deficits, address ROM deficits and address strength deficits to attain remaining goals. []  See Plan of Care  [x]  See progress note/recertification  []  See Discharge Summary         Progress towards goals / Updated goals:  1.  Pt will report No or Limited a little with returning to his work out/exercise regimen. 2. Pt will report at least 75% improvement with pain during night so he can sleep with ease. 3. Pt will be independent with HEP to improve ease with ADLs/recreationa activities.     PLAN  []  Upgrade activities as tolerated     [x]  Continue plan of care  []  Update interventions per flow sheet       []  Discharge due to:_  []  Other:_      Amina Lety, PTA 2/1/2019  3:14 PM    Future Appointments   Date Time Provider Narciso Vilchis   2/11/2019  1:45 PM Lindsay Gonzales MD [address]   4/24/2019  9:50 AM [facility] NURSE [facility] CARLOS SCHED   5/8/2019 10:30 AM Yesika Ritchie MD [address] [facility]

## 2019-02-04 ENCOUNTER — HOSPITAL ENCOUNTER (OUTPATIENT)
Dept: PHYSICAL THERAPY | Age: 79
End: 2019-02-04
Payer: MEDICARE

## 2019-02-08 ENCOUNTER — HOSPITAL ENCOUNTER (OUTPATIENT)
Dept: PHYSICAL THERAPY | Age: 79
End: 2019-02-08
Payer: MEDICARE

## 2019-02-11 ENCOUNTER — OFFICE VISIT (OUTPATIENT)
Dept: NEUROLOGY | Age: 79
End: 2019-02-11

## 2019-02-11 VITALS
WEIGHT: 205 LBS | HEIGHT: 72 IN | HEART RATE: 59 BPM | DIASTOLIC BLOOD PRESSURE: 60 MMHG | BODY MASS INDEX: 27.77 KG/M2 | OXYGEN SATURATION: 96 % | TEMPERATURE: 96.7 F | RESPIRATION RATE: 18 BRPM | SYSTOLIC BLOOD PRESSURE: 100 MMHG

## 2019-02-11 DIAGNOSIS — G47.33 OSA (OBSTRUCTIVE SLEEP APNEA): Primary | ICD-10-CM

## 2019-02-11 NOTE — PROGRESS NOTES
Chief Complaint Patient presents with  Sleep Study  
  folllow up Fall Risk Assessment, last 12 mths 2/11/2019 Able to walk? Yes Fall in past 12 months? No  
 
PHQ over the last two weeks 2/11/2019 Little interest or pleasure in doing things Not at all Feeling down, depressed, irritable, or hopeless Not at all Total Score PHQ 2 0

## 2019-02-15 ENCOUNTER — HOSPITAL ENCOUNTER (OUTPATIENT)
Dept: PHYSICAL THERAPY | Age: 79
Discharge: HOME | End: 2019-02-15
Payer: MEDICARE

## 2019-02-15 PROCEDURE — 97110 THERAPEUTIC EXERCISES: CPT

## 2019-02-15 NOTE — PROGRESS NOTES
[facility]  [address] [facility]  [phone] fax    Continued Plan of Care/ Re-certification for Physical Therapy Services    Patient name: Oli Juárez. Start of Care: 2018   Referral source: Harish Donis,* : 1940               Medical Diagnosis: Left elbow pain [M25.522]  Payor: VA MEDICARE / Plan: VA MEDICARE PART A & B / Product Type: Medicare /  Onset Date: ~4 weeks ago               Treatment Diagnosis: Left elbow pain   Prior Hospitalization: see medical history Provider#: [id number]   Medications: Verified on Patient summary List    Comorbidities: arthritis, weight change more than 10 lbs   Prior Level of Function: Right handed, ind with all mobility, working out everyday    Visits from [employer] [facility]: 14    Missed Visits: 2    The Plan of Care and following information is based on the patient's current status:  Goal: Pt will report No or Limited a little with returning to his work out/exercise regimen. Status at last note/certification: Only min soreness with elbow but mod pain with shoulder 19  Current Status: met    Goal: Pt will report at least 75% improvement with pain during night so he can sleep with ease.   Status at last note/certification:No more pain with elbow but having pain with left shoulder 19  Current Status: met    Goal: Pt will be independent with HEP to improve ease with ADLs/recreationa activities.    Status at last note/certification: Met   Current Status: met    Key functional changes: no significant pain with elbow, returning to work out routine with imrpoved understanding with work out verse resting    Problems/ barriers to goal attainment: aggravating shoulder pain     Problem List: pain affecting function, decrease strength, edema affecting function, decrease ADL/ functional abilitiies, decrease activity tolerance and decrease flexibility/ joint mobility    Treatment Plan: Therapeutic activities, Neuromuscular re-education, Physical agent/modality, Manual therapy, Patient education, Self Care training, Functional mobility training and Home safety training     Patient Goal (s) has been updated and includes: work out and do all activity with no pain     Goals for this certification period to be accomplished in 4 weeks   1. Pt will report No or Limited a little with returning to his work out/exercise regimen. 2. Pt will be independent with HEP to maintain/promote pain free ADLs/recreation activities. Frequency / Duration: Patient to be seen 1-2 times per week for 4 weeks:    Assessment / Recommendations:Pt making good progress with improving pain with Left elbow, improving coordination during exercises and improving understanding of exercise/work out duration and mode. Pt reports returning to his work out routine gradually. However, he reports aggravating Left shoulder pain along lat upper arm with pattern of RTC/labrum irritation. Pt demonstrates good understanding with current HEP and will follow up with MD/specialist on 2- for further instruction. Will hold PT for 2 weeks to wait for MD's order. Certification Period: 2- to 3-    Delmer Rinaldi PT 2/15/2019 8:23 AM    ________________________________________________________________________  I certify that the above Therapy Services are being furnished while the patient is under my care. I agree with the treatment plan and certify that this therapy is necessary. [] I have read the above and request that my patient continue as recommended.   [] I have read the above report and request that my patient continue therapy with the following changes/special instructions: _______________________________________  [] I have read the above report and request that my patient be discharged from therapy    Physician's Signature:____________Date:_________TIME:________    ** Signature, Date and Time must be completed for valid certification **    Please sign and return to [facility] - [facility]  [address] [place] [address]  [phone] fax

## 2019-02-15 NOTE — PROGRESS NOTES
PT DAILY TREATMENT NOTE 10-18    Patient Name: Yogi Aguilera. Date:2/15/2019  : 1940  [x]  Patient  Verified  Payor: VA MEDICARE / Plan: VA MEDICARE PART A & B / Product Type: Medicare /    In time: 8:32  Out time: 9:07  Total Treatment Time (min): 35  Visit #: 14 of     Medicare/BCBS Only   Total Timed Codes (min):  35 1:1 Treatment Time:  35       Treatment Area: Left elbow pain [M25.522]    SUBJECTIVE  Pain Level (0-10 scale): 0.5/10  Any medication changes, allergies to medications, adverse drug reactions, diagnosis change, or new procedure performed?: [x] No    [] Yes (see summary sheet for update)  Subjective functional status/changes:   [] No changes reported  Pt cont to report intermittent pain with his shoulder but very mild pain/soreness    OBJECTIVE        35 min Therapeutic Exercise:  [x] See flow sheet :   Rationale: increase ROM and increase strength to improve the patients ability to increase ease with ADls          With   [] TE   [] TA   [] neuro   [] other: Patient Education: [x] Review HEP    [] Progressed/Changed HEP based on:   [] positioning   [] body mechanics   [] transfers   [] heat/ice application    [] other:      Other Objective/Functional Measures:    Good form and no pain with HEP today even with ER and row in prone (which aggravated shoulder pain during prior last visit.)    Pain Level (0-10 scale) post treatment: 0/10    ASSESSMENT/Changes in Function: See progress note/recertification    []  See Plan of Care  [x]  See progress note/recertification  []  See Discharge Summary     Progress towards goals / Updated goals:  1. Pt will report No or Limited a little with returning to his work out/exercise regimen. Only min soreness with elbow but mod pain with shoulder 19  2. Pt will report at least 75% improvement with pain during night so he can sleep with ease. No more pain with elbow but having pain with left shoulder 19  3.  Pt will be independent with HEP to improve ease with ADLs/recreationa activities.  Met 2-15-19     PLAN  []  Upgrade activities as tolerated     []  Continue plan of care  []  Update interventions per flow sheet       []  Discharge due to:_  [x]  Other:_ await for MD's instruction on PT    Ata Jacob PT 2/15/2019  7:49 AM    Future Appointments   Date Time Provider Narciso Vilchis   2/15/2019  8:30 AM Altagracia MOYER [facility]   4/10/2019 11:45 AM Pj Meraz MD [address]   4/24/2019  9:50 AM [facility] CARLOS SCHED   5/8/2019 10:30 AM [facility]

## 2019-03-05 ENCOUNTER — HOSPITAL ENCOUNTER (OUTPATIENT)
Dept: PHYSICAL THERAPY | Age: 79
Discharge: HOME | End: 2019-03-05
Payer: MEDICARE

## 2019-03-05 PROCEDURE — 97110 THERAPEUTIC EXERCISES: CPT

## 2019-03-05 NOTE — PROGRESS NOTES
PT DAILY TREATMENT NOTE 10-18    Patient Name: Sterling Sheridan. Date:3/5/2019  : 1940  [x]  Patient  Verified  Payor: VA MEDICARE / Plan: VA MEDICARE PART A & B / Product Type: Medicare /    In time: 9:07  Out time: 9:36  Total Treatment Time (min): 29  Visit #: 15 of -    Medicare/BCBS Only   Total Timed Codes (min):  29 1:1 Treatment Time:  29       Treatment Area: Left elbow pain [M25.522]    SUBJECTIVE  Pain Level (0-10 scale): 0-5/10; 5/10 when moving  Any medication changes, allergies to medications, adverse drug reactions, diagnosis change, or new procedure performed?: [x] No    [] Yes (see summary sheet for update)  Subjective functional status/changes:   [] No changes reported  Pt reports MD would like him to cont PT for several more weeks with additional treatment for shoulder; pt will follow up with MD after finishing PT; MD will consider MRI for shoulder if cont to c/o pain. OBJECTIVE     29 min Therapeutic Exercise:  [x] See flow sheet :   Rationale: increase ROM and increase strength to improve the patients ability to increase ease with ADls       With   [] TE   [] TA   [] neuro   [] other: Patient Education: [x] Review HEP    [] Progressed/Changed HEP based on:   [] positioning   [] body mechanics   [] transfers   [] heat/ice application    [] other:      Other Objective/Functional Measures:    Pain along post shoulder, Triceps origin   Min pain at end range of KZ row and lat pull   No pain with elbow ext strengthening therex     Pain Level (0-10 scale) post treatment: 0/10    ASSESSMENT/Changes in Function: Pt returns after holding PT for 2 weeks; cont to c/o mod pain with left shoulder and intermittent pain with Left elbow. Pt reports MD would like him to cont PT for both shoulder and elbow; called MD today; PA will update and fax script to add shoulder PT. Pt cont to demonstrate good tolerance with therex but c/o mod pain at night and during ADLs/working out at home.  Will cont with PT for 3-4 more weeks; report back to MD as needed.      []  See Plan of Care  [x]  See progress note/recertification  []  See Discharge Summary     Goals for this certification period to be accomplished in 4 weeks   1. Pt will report No or Limited a little with returning to his work out/exercise regimen. 2. Pt will be independent with HEP to maintain/promote pain free ADLs/recreation activities.      PLAN  []  Upgrade activities as tolerated     []  Continue plan of care  []  Update interventions per flow sheet       []  Discharge due to:_  [x]  Other:_     Rosalia Escudero, PT 3/5/2019  8:15 AM    Future Appointments   Date Time Provider Narciso Mame   3/5/2019  9:00 AM Selwyn Glaser SBFLLTY [facility]   3/8/2019  3:30 PM Adonis Riojas [facility]   3/26/2019  8:30 PM [facility] SLEEP RM 1 [facility]   4/10/2019 11:45 AM Lindsay Gonzales MD [address]   4/24/2019  9:50 AM [facility] NURSE [place] CARLOS SHARMA   5/8/2019 10:30 AM [facility]

## 2019-03-08 ENCOUNTER — HOSPITAL ENCOUNTER (OUTPATIENT)
Dept: PHYSICAL THERAPY | Age: 79
Discharge: HOME | End: 2019-03-08
Payer: MEDICARE

## 2019-03-08 PROCEDURE — 97110 THERAPEUTIC EXERCISES: CPT

## 2019-03-08 NOTE — PROGRESS NOTES
PT DAILY TREATMENT NOTE 10-18    Patient Name: Aliya Amos. Date:3/8/2019  : 1940  [x]  Patient  Verified  Payor: VA MEDICARE / Plan: VA MEDICARE PART A & B / Product Type: Medicare /    In time:3:30  Out time:4:30  Total Treatment Time (min): 60  Visit #: 1 of 15    Medicare/BCBS Only   Total Timed Codes (min): 60  1:1 Treatment Time:  30       Treatment Area: Left elbow pain [M25.522]    SUBJECTIVE  Pain Level (0-10 scale): 0/10  Any medication changes, allergies to medications, adverse drug reactions, diagnosis change, or new procedure performed?: [x] No    [] Yes (see summary sheet for update)  Subjective functional status/changes:   [] No changes reported  I have a new order, and I don't know how they have incorporated the new order with the old order. Supervising PT was able to address the addition order for the Left shoulder to the patient and he reported satisfaction with explanation. OBJECTIVE    60; 1:1=30 min Therapeutic Exercise:  [x] See flow sheet :   Rationale: increase ROM and increase strength to improve the patients ability to return to normal activities. With   [] TE   [] TA   [] neuro   [] other: Patient Education: [x] Review HEP    [] Progressed/Changed HEP based on:   [] positioning   [] body mechanics   [] transfers   [] heat/ice application    [x] other Pt with overdoing his strengthening program:      Other Objective/Functional Measures: Pt with improved ROM of left shoulder joint as indicated by left shoulder flexion of 160 degrees, shoulder abduction/scaption at 175 degrees and IR rotation at 60 degrees and ER of 55 degrees.      Pain Level (0-10 scale) post treatment: 2/10  ASSESSMENT/Changes in Function: Progressing toward goals and pt has been instructed on adding the left shoulder exercises to his home exercise program.     Patient will continue to benefit from skilled PT services to modify and progress therapeutic interventions, address functional mobility deficits and address ROM deficits to attain remaining goals. [x]  See Plan of Care  []  See progress note/recertification  []  See Discharge Summary         Progress towards goals / Updated goals:    1. Pt will report No or Limited a little with returning to his work out/exercise regimen.   2. Pt will be independent with HEP to maintain/promote pain free ADLs/recreation activities.          PLAN  []  Upgrade activities as tolerated     []  Continue plan of care  []  Update interventions per flow sheet       []  Discharge due to:_  []  Other:_      Katie Salomon 3/8/2019  3:15 PM    Future Appointments   Date Time Provider Narciso Vilchis   3/8/2019  3:30 PM Lonne Exon NWUMZYP [facility]   3/12/2019  3:00 PM Amy Pagan PTA [facility]CPTPB [facility]   3/14/2019  3:30 PM Lugene Black XOERNEM [facility]   3/20/2019  3:30 PM Lonne Exon NFFCUQA [facility]   3/22/2019  3:30 PM Lugene Black [id number] [facility]   3/25/2019  3:30 PM Shama Bell PTA [facility]   3/26/2019  8:30 PM [facility] 1 MMCSL [facility]   3/29/2019  3:30 PM Lugene Black FNVTQHG [facility]   4/10/2019 11:45 AM MD Beverly Bernard [address]   4/24/2019  9:50 AM [facility] NURSE [facility] CARLOS [facility]   5/8/2019 10:30 AM [facility]

## 2019-03-12 ENCOUNTER — APPOINTMENT (OUTPATIENT)
Dept: PHYSICAL THERAPY | Age: 79
End: 2019-03-12
Payer: MEDICARE

## 2019-03-14 ENCOUNTER — HOSPITAL ENCOUNTER (OUTPATIENT)
Dept: PHYSICAL THERAPY | Age: 79
Discharge: HOME | End: 2019-03-14
Payer: MEDICARE

## 2019-03-14 PROCEDURE — 97014 ELECTRIC STIMULATION THERAPY: CPT

## 2019-03-14 PROCEDURE — 97110 THERAPEUTIC EXERCISES: CPT

## 2019-03-14 NOTE — PROGRESS NOTES
[facility] [place] [employer]  GREG  [address] [place] [address]  [phone] fax    Physical Therapy Progress Note    Patient name: Oli Kaufman. Start of Care: 2018   Referral source: Campbell, Randell Fothergill,* : 1940               Medical Diagnosis: Left elbow pain [M25.522]  Payor: VA MEDICARE / Plan: VA MEDICARE PART A & B / Product Type: Medicare /  Onset Date: ~4 weeks ago               Treatment Diagnosis: Left elbow pain   Prior Hospitalization: see medical history Provider#: [id number]   Medications: Verified on Patient summary List    Comorbidities: arthritis, weight change more than 10 lbs   Prior Level of Function: Right handed, ind with all mobility, working out everyday    Visits from [facility]: 17    Missed Visits: 2    Established Goals:         Excellent           Good         Limited           None  [] Increased ROM   []  []  []  []  [] Increased Strength  []  []  []  []  [x] Increased Mobility  []  [x]  []  []   [x] Decreased Pain   []  []  [x]  []  [] Decreased Swelling  []  []  []  []    Key Functional Changes: no significant pain with ADLs and therex but mod pain at night    Updated Goals: to be achieved in 3 weeks:   1. Pt will report at least 75% improvement with Left shoulder and elbow pain to improve his QOL. 2. Pt will be independent with HEP to maintain/promote pain free ADLs/recreation activities.     ASSESSMENT/RECOMMENDATIONS: pt fluctuates with pain, no significant pain at daytime and during PT but mod pain at night. Pt demonstrates good understanding with HEP but cont to demonstrates fair endurance with parascapular, abd and ER of Left shoulder. Pt would cont to benefit from skilled PT for 3 more weeks per MD's order. Refer back for further imaging/testing if cont to fluctuate with pain.      [x]Continue therapy per initial plan/protocol at a frequency of  2 x per week for 3 weeks  []Continue therapy with the following recommended changes:_____________________      _____________________________________________________________________  []Discontinue therapy progressing towards or have reached established goals  []Discontinue therapy due to lack of appreciable progress towards goals  []Discontinue therapy due to lack of attendance or compliance  []Await Physician's recommendations/decisions regarding therapy  []Other:________________________________________________________________    Thank you for this referral.   Dom Field, PT 3/14/2019 3:46 PM    NOTE TO PHYSICIAN:  [address]   FAX TO [facility]: [phone]  If you are unable to process this request in 24 hours please contact our office: [phone]    ? I have read the above report and request that my patient continue as recommended. ? I have read the above report and request that my patient continue therapy with the following changes/special instructions:____________________________________  ? I have read the above report and request that my patient be discharged from therapy.     Physicians signature: ______________________________Date: ______Time:______

## 2019-03-14 NOTE — PROGRESS NOTES
PT DAILY TREATMENT NOTE 10-18    Patient Name: Mena Fitzgerald.   Date:3/14/2019  : 1940  [x]  Patient  Verified  Payor: VA MEDICARE / Plan: VA MEDICARE PART A & B / Product Type: Medicare /    In time: 3:33  Out time:4:24  Total Treatment Time (min): 51  Visit #: 2 of 15    Medicare/BCBS Only   Total Timed Codes (min):  33 1:1 Treatment Time:  33       Treatment Area: Left elbow pain [M25.522]    SUBJECTIVE  Pain Level (0-10 scale): 0/10  Any medication changes, allergies to medications, adverse drug reactions, diagnosis change, or new procedure performed?: [x] No    [] Yes (see summary sheet for update)  Subjective functional status/changes:   [] No changes reported  Pt reports mod pain during evening, lat and post Left shoulder    OBJECTIVE    Modality rationale: decrease inflammation and decrease pain to improve the patients ability to improve tolerance for ADLs/recreational activities   Min Type Additional Details   15 [] Estim:  []Unatt       []IFC  [x]Premod                        []Other:  [x]w/ice   []w/heat  Position: sitting  Location: Left shoulder    [] Estim: []Att    []TENS instruct  []NMES                    []Other:  []w/US   []w/ice   []w/heat  Position:  Location:    []  Traction: [] Cervical       []Lumbar                       [] Prone          []Supine                       []Intermittent   []Continuous Lbs:  [] before manual  [] after manual    []  Ultrasound: []Continuous   [] Pulsed                           []1MHz   []3MHz W/cm2:  Location:    []  Iontophoresis with dexamethasone         Location: [] Take home patch   [] In clinic   3 []  Ice     []  heat  [x]  Ice massage  []  Laser   []  Anodyne Position: sitting  Location: Left shoulder    []  Laser with stim  []  Other:  Position:  Location:    []  Vasopneumatic Device Pressure:       [] lo [] med [] hi   Temperature: [] lo [] med [] hi   [x] Skin assessment post-treatment:  []intact [x]redness- no adverse reaction []redness - adverse reaction:          33 min Therapeutic Exercise:  [x] See flow sheet :   Rationale: increase ROM and increase strength to improve the patients ability to return to normal activities. With   [] TE   [] TA   [] neuro   [] other: Patient Education: [x] Review HEP    [] Progressed/Changed HEP based on:   [] positioning   [] body mechanics   [] transfers   [] heat/ice application    [] other:      Other Objective/Functional Measures: Mod fatigued with therex   Min shoulder hiking with abd   No pain/symptoms with Left shoulder/elbow     Pain Level (0-10 scale) post treatment: 0/10    ASSESSMENT/Changes in Function: See progress note/recertification    Patient will continue to benefit from skilled PT services to modify and progress therapeutic interventions, address functional mobility deficits and address ROM deficits to attain remaining goals. []  See Plan of Care  [x]  See progress note/recertification  []  See Discharge Summary         Progress towards goals / Updated goals:  1. Pt will report No or Limited a little with returning to his work out/exercise regimen. Not met due to fluctuating pain 3-14-19  2.  Pt will be independent with HEP to maintain/promote pain free ADLs/recreation activities. good understanding 3-14-19     PLAN  []  Upgrade activities as tolerated     []  Continue plan of care  []  Update interventions per flow sheet       []  Discharge due to:_  []  Other:_      Blake Parry, PT 3/14/2019  8:03 AM    Future Appointments   Date Time Provider Narciso Vilchis   3/14/2019  3:30 PM Adina Bailey [id number] [facility]   3/20/2019  3:30 PM Duane Nasuti LLLNUUE [facility]   3/22/2019  3:30 PM Adina Bailey JICLSJY [facility]   3/25/2019  3:30 PM Cynthia Pruitt PTA MMCPTPB [facility]   3/26/2019  8:30 PM [facility] 1 MMCSL [facility]   3/29/2019  3:30 PM Adina Bailey [id number] [facility]   4/10/2019 11:45 AM Sanam Velasquez MD [address]   4/24/2019  9:50 AM [address] [place] [address] SCHED   5/8/2019 10:30 AM MD Madhuri Anton

## 2019-03-20 ENCOUNTER — HOSPITAL ENCOUNTER (OUTPATIENT)
Dept: PHYSICAL THERAPY | Age: 79
Discharge: HOME | End: 2019-03-20
Payer: MEDICARE

## 2019-03-20 PROCEDURE — 97110 THERAPEUTIC EXERCISES: CPT

## 2019-03-20 PROCEDURE — 97014 ELECTRIC STIMULATION THERAPY: CPT

## 2019-03-20 NOTE — PROGRESS NOTES
PT DAILY TREATMENT NOTE 10-18    Patient Name: Dionna Agee Date:3/20/2019  : 1940  [x]  Patient  Verified  Payor: Ashlie Valles / Plan: VA MEDICARE PART A & B / Product Type: Medicare /    In time:3:30 Out time:4:45  Total Treatment Time (min):   Visit #: 3 of 15    Medicare/BCBS Only   Total Timed Codes (min):  45 1:1 Treatment Time:  45       Treatment Area: Left elbow pain [M25.522]    SUBJECTIVE  Pain Level (0-10 scale): 4/10  Any medication changes, allergies to medications, adverse drug reactions, diagnosis change, or new procedure performed?: [x] No    [] Yes (see summary sheet for update)  Subjective functional status/changes:   [] No changes reported  The left shoulder hurts depending on what I do. OBJECTIVE    Modality rationale: decrease edema, decrease inflammation and decrease pain to improve the patients ability to return to normal activities.    Min Type Additional Details   15 [] Estim:  []Unatt       []IFC  [x]Premod                        []Other:  [x]w/ice   []w/heat  Position:seated  Location:R shoulder    [] Estim: []Att    []TENS instruct  []NMES                    []Other:  []w/US   []w/ice   []w/heat  Position:  Location:    []  Traction: [] Cervical       []Lumbar                       [] Prone          []Supine                       []Intermittent   []Continuous Lbs:  [] before manual  [] after manual    []  Ultrasound: []Continuous   [] Pulsed                           []1MHz   []3MHz W/cm2:  Location:    []  Iontophoresis with dexamethasone         Location: [] Take home patch   [] In clinic    []  Ice     []  heat  []  Ice massage  []  Laser   []  Anodyne Position:  Location:    []  Laser with stim  []  Other:  Position:  Location:    []  Vasopneumatic Device Pressure:       [] lo [] med [] hi   Temperature: [] lo [] med [] hi   [x] Skin assessment post-treatment:  [x]intact [x]redness- no adverse reaction    []redness - adverse reaction:       30 min Therapeutic Exercise:  [x] See flow sheet :   Rationale: increase ROM, increase strength and improve coordination to improve the patients ability to return to normal activities. With   [] TE   [] TA   [] neuro   [] other: Patient Education: [x] Review HEP    [] Progressed/Changed HEP based on:   [] positioning   [] body mechanics   [] transfers   [] heat/ice application    [] other:      Other Objective/Functional Measures: Pt with weakness of right shoulder that is limiting his exercise regiment. Pt has been educated on pacing and allowing the shoulder joint to heal without causing further injury as pt tends to over do his HEP according to what he has told this therapist.     Pain Level (0-10 scale) post treatment: 4/10    ASSESSMENT/Changes in Function: Slow gains toward LTGs, pt is mainly complaining of right shoulder pain and no further right elbow pain at this time. Patient will continue to benefit from skilled PT services to address ROM deficits, address strength deficits and analyze and address soft tissue restrictions to attain remaining goals. [x]  See Plan of Care  []  See progress note/recertification  []  See Discharge Summary         Progressing toward goals/updated goals:  1. Pt will report No or Limited a little with returning to his work out/exercise regimen. Not met due to fluctuating pain 3-14-19  2.  Pt will be independent with HEP to maintain/promote pain free ADLs/recreation activities. good understanding 3-14-19         PLAN  [x]  Upgrade activities as tolerated     [x]  Continue plan of care  []  Update interventions per flow sheet       []  Discharge due to:_  []  Other:_      Zafar Cruz 3/20/2019  3:22 PM    Future Appointments   Date Time Provider Narciso Vilchis   3/20/2019  3:30 PM Bee Mathis [id number] [facility]   3/22/2019  1:20 PM JOE Doran [id number]   3/22/2019  3:30 PM Bibiana Abdul [id number] [facility]   3/25/2019  3:30 PM Jb Auguste PTA MMCPTPB [facility]   3/26/2019 8:30 PM [facility] SLEEP RM 1 MMCSL [facility]   3/29/2019  3:30 PM Olvin Check MMCPTPB [facility]   4/10/2019 11:45 AM Trey Hui MD [address]   4/24/2019  9:50 AM [facility] Carlos Cheek [facility]URSE [facility]   5/8/2019 10:30 AM MD [facility]

## 2019-03-22 ENCOUNTER — HOSPITAL ENCOUNTER (OUTPATIENT)
Dept: PHYSICAL THERAPY | Age: 79
End: 2019-03-22
Payer: MEDICARE

## 2019-03-25 ENCOUNTER — HOSPITAL ENCOUNTER (OUTPATIENT)
Dept: PHYSICAL THERAPY | Age: 79
Discharge: HOME | End: 2019-03-25
Payer: MEDICARE

## 2019-03-25 PROCEDURE — 97014 ELECTRIC STIMULATION THERAPY: CPT

## 2019-03-25 PROCEDURE — 97110 THERAPEUTIC EXERCISES: CPT

## 2019-03-25 NOTE — PROGRESS NOTES
PT DAILY TREATMENT NOTE 10-18    Patient Name: Norm Ch. Date:3/25/2019  : 1940  [x]  Patient  Verified  Payor: VA MEDICARE / Plan: VA MEDICARE PART A & B / Product Type: Medicare /    In time:3:30  Out time:4:15  Total Treatment Time (min): 45  Visit #: 4 of 15    Medicare/BCBS Only   Total Timed Codes (min):  35 1:1 Treatment Time:  25       Treatment Area: Left elbow pain [M25.522]    SUBJECTIVE  Pain Level (0-10 scale): 0/10  Any medication changes, allergies to medications, adverse drug reactions, diagnosis change, or new procedure performed?: [x] No    [] Yes (see summary sheet for update)  Subjective functional status/changes:   [] No changes reported  Pt states he has not been doing his HEP.     OBJECTIVE    Modality rationale: decrease pain to improve the patients ability to perform functional movements with more ease   Min Type Additional Details   10 [] Estim:  []Unatt       []IFC  [x]Premod                        []Other:  [x]w/ice   []w/heat  Position:Seated  Location:Left shoulder    [] Estim: []Att    []TENS instruct  []NMES                    []Other:  []w/US   []w/ice   []w/heat  Position:  Location:    []  Traction: [] Cervical       []Lumbar                       [] Prone          []Supine                       []Intermittent   []Continuous Lbs:  [] before manual  [] after manual    []  Ultrasound: []Continuous   [] Pulsed                           []1MHz   []3MHz W/cm2:  Location:    []  Iontophoresis with dexamethasone         Location: [] Take home patch   [] In clinic    []  Ice     []  heat  []  Ice massage  []  Laser   []  Anodyne Position:  Location:    []  Laser with stim  []  Other:  Position:  Location:    []  Vasopneumatic Device Pressure:       [] lo [] med [] hi   Temperature: [] lo [] med [] hi   [] Skin assessment post-treatment:  []intact []redness- no adverse reaction    []redness - adverse reaction:       35 min Therapeutic Exercise:  [x] See flow sheet : Rationale: increase ROM and increase strength to improve the patients ability to perform ADL's without pain. With   [] TE   [] TA   [] neuro   [] other: Patient Education: [x] Review HEP    [] Progressed/Changed HEP based on:   [] positioning   [] body mechanics   [] transfers   [] heat/ice application    [] other:      Other Objective/Functional Measures: Pt performed TE's per flow sheet without increased pain. Pain Level (0-10 scale) post treatment: 0/10    ASSESSMENT/Changes in Function: Pt performed TE's well taking verbal and tactile cues. Patient will continue to benefit from skilled PT services to modify and progress therapeutic interventions, address functional mobility deficits, address ROM deficits, address strength deficits, analyze and address soft tissue restrictions and analyze and cue movement patterns to attain remaining goals. []  See Plan of Care  []  See progress note/recertification  []  See Discharge Summary         Progress towards goals / Updated goals:  1. Pt will report No or Limited a little with returning to his work out/exercise regimen. Not met due to fluctuating pain 3-14-19  2.  Pt will be independent with HEP to maintain/promote pain free ADLs/recreation activities. good understanding 3-14-19     PLAN  []  Upgrade activities as tolerated     []  Continue plan of care  []  Update interventions per flow sheet       []  Discharge due to:_  []  Other:_      Frandy Riojas PTA 3/25/2019  3:55 PM    Future Appointments   Date Time Provider Narciso Vilchis   3/26/2019  8:30 PM [address] MMCSL [facility]   3/29/2019  3:30 PM Mercedes Gaines MMCPTPB [facility]   4/4/2019  3:15 PM Fazal Castaneda MD [address] [facility]   4/10/2019 11:45 AM Sanam Velasquez MD [address]   4/24/2019  9:50 AM [facility] Ml Pruitt NURSE [facility] CARLOS SCHED   5/8/2019 10:30 AM Fazal Castaneda MD [facility] [facility]

## 2019-03-26 ENCOUNTER — HOSPITAL ENCOUNTER (OUTPATIENT)
Dept: SLEEP MEDICINE | Age: 79
Discharge: HOME | End: 2019-03-26
Payer: MEDICARE

## 2019-03-26 DIAGNOSIS — G47.33 OSA (OBSTRUCTIVE SLEEP APNEA): ICD-10-CM

## 2019-03-26 PROCEDURE — 95811 POLYSOM 6/>YRS CPAP 4/> PARM: CPT

## 2019-03-27 VITALS — SYSTOLIC BLOOD PRESSURE: 117 MMHG | HEART RATE: 60 BPM | DIASTOLIC BLOOD PRESSURE: 60 MMHG

## 2019-03-29 ENCOUNTER — HOSPITAL ENCOUNTER (OUTPATIENT)
Dept: PHYSICAL THERAPY | Age: 79
Discharge: HOME | End: 2019-03-29
Payer: MEDICARE

## 2019-03-29 PROCEDURE — 97110 THERAPEUTIC EXERCISES: CPT

## 2019-03-29 PROCEDURE — 97014 ELECTRIC STIMULATION THERAPY: CPT

## 2019-03-29 NOTE — PROGRESS NOTES
PT DAILY TREATMENT NOTE 10-18    Patient Name: Ravindra Barnard.   Date:3/29/2019  : 1940  [x]  Patient  Verified  Payor: VA MEDICARE / Plan: VA MEDICARE PART A & B / Product Type: Medicare /    In time: 3:25  Out time:4:25  Total Treatment Time (min): 60  Visit #: 5 of 15    Medicare/BCBS Only   Total Timed Codes (min):  45 1:1 Treatment Time:  40       Treatment Area: Left elbow pain [M25.522]    SUBJECTIVE  Pain Level (0-10 scale): 1/10  Any medication changes, allergies to medications, adverse drug reactions, diagnosis change, or new procedure performed?: [x] No    [] Yes (see summary sheet for update)  Subjective functional status/changes:   [] No changes reported  Pt reports improving pain and being able to sleep better    OBJECTIVE    Modality rationale: decrease pain to improve the patients ability to perform functional movements with more ease   Min Type Additional Details    15 [] Estim:  []Unatt       []IFC  [x]Premod                        []Other:  [x]w/ice   []w/heat  Position:Seated  Location:Left shoulder      [] Estim: []Att    []TENS instruct  []NMES                    []Other:  []w/US   []w/ice   []w/heat  Position:  Location:      []  Traction: [] Cervical       []Lumbar                       [] Prone          []Supine                       []Intermittent   []Continuous Lbs:  [] before manual  [] after manual      []  Ultrasound: []Continuous   [] Pulsed                           []1MHz   []3MHz W/cm2:  Location:      []  Iontophoresis with dexamethasone         Location: [] Take home patch   [] In clinic      []  Ice     []  heat  []  Ice massage  []  Laser   []  Anodyne Position:  Location:      []  Laser with stim  []  Other:  Position:  Location:      []  Vasopneumatic Device Pressure:       [] lo [] med [] hi   Temperature: [] lo [] med [] hi    [] Skin assessment post-treatment:  []intact []redness- no adverse reaction    []redness - adverse reaction:         45 min Therapeutic Exercise:  [x] See flow sheet :   Rationale: increase ROM and increase strength to improve the patients ability to perform ADL's without pain. With   [] TE   [] TA   [] neuro   [] other: Patient Education: [x] Review HEP    [] Progressed/Changed HEP based on:   [] positioning   [] body mechanics   [] transfers   [] heat/ice application    [] other:      Other Objective/Functional Measures:    Min stiffness with end range of shoulder flex     Min fatigued with ecc triceps   Good form with all therex, no pain    Pain Level (0-10 scale) post treatment: 0/10    ASSESSMENT/Changes in Function: pt making good progress with improving pain overall. Discuss gradually return to his work out routine this coming week. Educated pt on appropriate duration, intensity, work and rest for work out/exercise routine, pt demonstrate good understanding. Will progress therex as tolerated. Considering discharge next week if no complaints/difficulties with returning to work out routine     Patient will continue to benefit from skilled PT services to modify and progress therapeutic interventions, address functional mobility deficits, address ROM deficits, address strength deficits, analyze and address soft tissue restrictions and analyze and cue movement patterns to attain remaining goals. []  See Plan of Care  [x]  See progress note/recertification  []  See Discharge Summary         Progress towards goals / Updated goals:  1. Pt will report No or Limited a little with returning to his work out/exercise regimen. Not met due to fluctuating pain 3-14-19  2.  Pt will be independent with HEP to maintain/promote pain free ADLs/recreation activities. good understanding 3-14-19     PLAN  []  Upgrade activities as tolerated     []  Continue plan of care  []  Update interventions per flow sheet       [x]  Discharge within 1-2 week due to met/progressing towards goals  []  Other:_       Lakisha Monteiro, PT 3/29/2019  3:26 PM    Future Appointments   Date Time Provider Narciso Mame   3/29/2019  3:30 PM Katja DAVIES [facility]   4/4/2019  3:15 PM [facility] [address] [facility]   4/10/2019 11:45 AM Manfred Miramontes  [facility]   4/24/2019  9:50 AM Laureano Saha [facility]SE [facility]   5/8/2019 10:30 AM Cheri Yu MD [facility]

## 2019-04-02 ENCOUNTER — APPOINTMENT (OUTPATIENT)
Dept: PHYSICAL THERAPY | Age: 79
End: 2019-04-02

## 2019-04-05 ENCOUNTER — HOSPITAL ENCOUNTER (OUTPATIENT)
Dept: PHYSICAL THERAPY | Age: 79
Discharge: HOME | End: 2019-04-05

## 2019-04-10 ENCOUNTER — OFFICE VISIT (OUTPATIENT)
Dept: NEUROLOGY | Age: 79
End: 2019-04-10

## 2019-04-10 VITALS
HEART RATE: 50 BPM | RESPIRATION RATE: 16 BRPM | OXYGEN SATURATION: 97 % | DIASTOLIC BLOOD PRESSURE: 70 MMHG | WEIGHT: 209 LBS | BODY MASS INDEX: 28.31 KG/M2 | SYSTOLIC BLOOD PRESSURE: 120 MMHG | TEMPERATURE: 98 F | HEIGHT: 72 IN

## 2019-04-10 DIAGNOSIS — F51.12 INSUFFICIENT SLEEP SYNDROME: ICD-10-CM

## 2019-04-10 DIAGNOSIS — G47.33 OSA (OBSTRUCTIVE SLEEP APNEA): Primary | ICD-10-CM

## 2019-04-10 NOTE — PROGRESS NOTES
4/10/2019 11:55 AM 
 
SSN: [id number] Subjective: 81-year-old male coming for chief complaint of follow-up of obstructive sleep apnea. Last time here on February 11 we reviewed a January overnight sleep study which showed an overall AHI of 3 which increased to 26 during REM sleep accompanied by desaturations down to 84 percent. He return for a CPAP titration trial and he did quite well on CPAP at 8 with a Khang Nasal Large mask. He is highly motivated to have this treated. Social History Socioeconomic History  Marital status:  Spouse name: Not on file  Number of children: Not on file  Years of education: Not on file  Highest education level: Not on file Occupational History  Not on file Social Needs  Financial resource strain: Not on file  Food insecurity:  
  Worry: Not on file Inability: Not on file  Transportation needs:  
  Medical: Not on file Non-medical: Not on file Tobacco Use  Smoking status: Former Smoker  Smokeless tobacco: Never Used Substance and Sexual Activity  Alcohol use: Yes  Drug use: Not on file  Sexual activity: Not on file Lifestyle  Physical activity:  
  Days per week: Not on file Minutes per session: Not on file  Stress: Not on file Relationships  Social connections:  
  Talks on phone: Not on file Gets together: Not on file Attends Bahai service: Not on file Active member of club or organization: Not on file Attends meetings of clubs or organizations: Not on file Relationship status: Not on file  Intimate partner violence:  
  Fear of current or ex partner: Not on file Emotionally abused: Not on file Physically abused: Not on file Forced sexual activity: Not on file Other Topics Concern  Not on file Social History Narrative  Not on file Family History Problem Relation Age of Onset  Breast Cancer Mother  Cancer Father  Cancer Sister   
     hodgkins Current Outpatient Medications Medication Sig Dispense Refill  coenzyme q10 300 mg cap Take  by mouth.  cyclobenzaprine (FLEXERIL) 5 mg tablet TAKE ONE-HALF TABLET BY MOUTH EVERY DAY AT BEDTIME AS NEEDED FOR PAIN  1  
 docusate sodium 100 mg tab Take  by mouth.  KLOR-CON M10 10 mEq tablet TAKE 1 TABLET BY MOUTH EVERY DAY  3  
 tamsulosin (FLOMAX) 0.4 mg capsule Take 1 Cap by mouth daily. 90 Cap 3  
 tadalafil (CIALIS) 5 mg tablet Take 1 Tab by mouth daily. Indications: Erectile Dysfunction, SYMPTOMATIC BENIGN PROSTATIC HYPERPLASIA 30 Tab 6  LUMIGAN 0.01 % ophthalmic drops  COMBIGAN 0.2-0.5 % drop ophthalmic solution  simvastatin (ZOCOR) 20 mg tablet  Cholecalciferol, Vitamin D3, (VITAMIN D3) 1,000 unit cap Take  by mouth.  b complex vitamins (B COMPLEX 1) tablet Take 1 Tab by mouth daily.  multivitamin (ONE A DAY) tablet Take 1 Tab by mouth daily.  omega-3 fatty acids-vitamin e (FISH OIL) 1,000 mg cap Take 1 Cap by mouth.  Flaxseed Oil Oil by Does Not Apply route.  zinc 50 mg capsule Take  by mouth.  melatonin 3 mg tablet Take  by mouth.  SODIUM CHLORIDE by Does Not Apply route.  cycloSPORINE (RESTASIS) 0.05 % ophthalmic emulsion Administer 1 Drop to both eyes two (2) times a day.  aspirin 81 mg tablet Take 81 mg by mouth.  loteprednol etabonate (LOTEMAX) 0.5 % ophthalmic suspension Administer 1 Drop to both eyes four (4) times daily.  Saccharomyces boulardii (FLORASTOR) 250 mg capsule Take  by mouth.  dorzolamide (TRUSOPT) 2 % ophthalmic solution INSTILL 1 DROP INTO BOTH EYES TWICE A DAY  2  
 hydroCHLOROthiazide (HYDRODIURIL) 25 mg tablet TAKE ONE TABLET BY MOUTH ONE TIME DAILY  1  
 sildenafil, antihypertensive, (REVATIO) 20 mg tablet TAKE 1 TAB BY MOUTH AS NEEDED.  TAKE 1-5 TABS AS DIRECTED INDICATIONS: [facility] 40 Tab 0  
  AZOPT 1 % ophthalmic suspension  GLUCOSAM/MSM/CHOND/YOD015/HYAL (GLUCOSAMINE-CHONDROITIN-MSM PO) Take  by mouth. Past Medical History:  
Diagnosis Date  Arthritis  BPH (benign prostatic hyperplasia)  Hemospermia  High blood pressure  High cholesterol  Impotence of organic origin  Inguinal hernia  Insomnia  Rt groin pain  Urge incontinence Past Surgical History:  
Procedure Laterality Date  HX CATARACT REMOVAL    
 HX HERNIA REPAIR No Known Allergies Vital signs:   
Visit Vitals /70 (BP 1 Location: Left arm, BP Patient Position: Sitting) Pulse (!) 50 Temp 98 °F (36.7 °C) (Oral) Resp 16 Ht 6' (1.829 m) Wt 94.8 kg (209 lb) SpO2 97% BMI 28.35 kg/m² Review of Systems:  
GENERAL: Denies fever or fatigue CARDIAC: No CP or SOB PULMONARY: No cough of SOB MUSCULOSKELETAL: No new joint pain NEURO: SEE HPI 
 
 
EXAM: Alert, in NAD. Heart is regular. Oriented x3, EOM's are full, PERRL, no facial asymmetries. Strength and tone are normal. DTR's +2, gait symmetric Assessment/Plan: Moderate ASH during REM with desats to 84%, responding to CPAP at 8 with a Khang Nasal small mask. . Discussed CPAP pitfalls, consequences of untreated ASH, and desensitization techniques to get used to it. Will start at above settings. He will return for a download in 2 months. PLEASE NOTE:  
Portions of this document may have been produced using voice recognition software. Unrecognized errors in transcription may be present. This note will not be viewable in [address]

## 2019-04-10 NOTE — PROGRESS NOTES
ROOM # 2 Oli Kaur. presents today for Chief Complaint Patient presents with  Follow-up  Sleep Study Oli Kaur. preferred language for health care discussion is english/other. Depression Screening: 
3 most recent [facility] Screens 4/10/2019 2/11/2019 12/14/2018 Little interest or pleasure in doing things Not at all Not at all Not at all Feeling down, depressed, irritable, or hopeless Not at all Not at all Not at all Total Score PHQ 2 0 0 0 Learning Assessment: 
No flowsheet data found. Abuse Screening: No flowsheet data found. Fall Risk Fall Risk Assessment, last 12 mths 4/10/2019 2/11/2019 12/14/2018 Able to walk? Yes Yes Yes Fall in past 12 months? No No No  
 
 
Visit Vitals /70 (BP 1 Location: Left arm, BP Patient Position: Sitting) Pulse (!) 50 Temp 98 °F (36.7 °C) (Oral) Resp 16 Ht 6' (1.829 m) Wt 209 lb (94.8 kg) SpO2 97% BMI 28.35 kg/m² Health Maintenance reviewed and discussed per provider. Yes Oli Kaur. is due for Health Maintenance Due Topic Date Due  
 DTaP/Tdap/Td series (1 - Tdap) 02/24/2006  GLAUCOMA SCREENING Q2Y  04/15/2006  Pneumococcal 65+ years (2 of 2 - PPSV23) 05/13/2016  MEDICARE YEARLY EXAM  03/14/2018 Please order/place referral if appropriate. Advance Directive: 1. Do you have an advance directive in place? Patient Reply: No 
 
2. If not, would you like material regarding how to put one in place? Patient Reply: No 
 
Coordination of Care: 1. Have you been to the ER, urgent care clinic since your last visit? Hospitalized since your last visit?  No

## 2019-05-07 ENCOUNTER — TELEPHONE (OUTPATIENT)
Dept: NEUROLOGY | Age: 79
End: 2019-05-07

## 2019-05-07 NOTE — TELEPHONE ENCOUNTER
Ailyn Feng @ [address] [place] [address]. states that she rec'd pt's 12/14/18 office notes with Dr. Dawood Burleson' signature, however, it did not have the date of signature. Please resend office notes containing both Dr. Dawood Burleson' signature and date to fax #: [phone].

## 2019-05-14 NOTE — TELEPHONE ENCOUNTER
Vera Clarke from [facility] calling again regarding note. Did received signed note but must be dated either date of signature or date of visit.

## 2019-05-23 NOTE — TELEPHONE ENCOUNTER
Pt requests status on CPAP order. Pt states he has called [facility] three times and each time they have told him that the information they need to process order hasn't been rec'd by our office. Please contact pt with update on CPAP order. [phone] or [phone].

## 2019-05-30 NOTE — PROGRESS NOTES
[facility]  [address] [place] [address]  [phone] fax    Physical Therapy Discharge Summary      Patient name: Oli Youssef. Start of Care: 12/18/2018   Referral source: Adalberto Donis,* YNC: 4/22/70[birth year]               Medical Diagnosis: Left elbow pain [M25.522]  Payor: Cheryl Valdes / Plan: [place] MEDICARE PART A & B / Product Type: Medicare /  Onset Date: ~4 weeks ago               Treatment Diagnosis: Left elbow pain   Prior Hospitalization: see medical history Provider#: [id number]   Medications: Verified on Patient summary List    Comorbidities: arthritis, weight change more than 10 lbs   Prior Level of Function: Right handed, ind with all mobility, working out everyday    Visits from Start of Care: 20    Missed Visits: 2    Reporting Period : 12- to 3-    Summary of Care:  Goal: Pt will report No or Limited a little with returning to his work out/exercise regimen. Status at last note/certification: returning to PLOF gradually    Status at discharge: nearly met    Goal: Pt will be independent with HEP to maintain/promote pain free ADLs/recreation activities.   Status at last note/certification:good understanding 3-14-19  Status at discharge: not met    ASSESSMENT/RECOMMENDATIONS: pt making good progress and planning to discharge but was non-compliant with cont PT for 1 more weeks for HEP review. The severity rating is based on clinical judgment and the FOTO score.     [x]Discontinue therapy: [x]Patient has reached or is progressing toward set goals      [x]Patient is non-compliant or has abdicated      []Due to lack of appreciable progress towards set goals    Carolina Ferraro, PT 5/30/2019 10:51 AM

## 2019-07-30 ENCOUNTER — OFFICE VISIT (OUTPATIENT)
Dept: NEUROLOGY | Age: 79
End: 2019-07-30

## 2019-07-30 VITALS
DIASTOLIC BLOOD PRESSURE: 60 MMHG | HEIGHT: 72 IN | RESPIRATION RATE: 18 BRPM | WEIGHT: 209 LBS | BODY MASS INDEX: 28.31 KG/M2 | OXYGEN SATURATION: 94 % | SYSTOLIC BLOOD PRESSURE: 110 MMHG | HEART RATE: 61 BPM

## 2019-07-30 DIAGNOSIS — G47.33 OSA (OBSTRUCTIVE SLEEP APNEA): Primary | ICD-10-CM

## 2019-07-30 DIAGNOSIS — G47.10 HYPERSOMNIA: ICD-10-CM

## 2019-07-30 NOTE — PROGRESS NOTES
7/30/2019 11:55 AM    SSN: [id number]    Subjective: 78-year-old male coming for follow-up of obstructive sleep apnea. His last visit with me was in April when we reviewed a sleep study he had in January showing an overall AHI of 3 increase REM to 26 with desaturations down to 84%. Given the severity during REM we proceeded with a CPAP titration trial and he did well on CPAP at 8 with a Khang nasal large mask, which was started last time he was here. His download through 7/28 showed 25/30 days with aver of [id number], AHI of 0.3, median leak low at 8.7 l/min. Social History     Socioeconomic History    Marital status:      Spouse name: Not on file    Number of children: Not on file    Years of education: Not on file    Highest education level: Not on file   Occupational History    Not on file   Social Needs    Financial resource strain: Not on file    Food insecurity:     Worry: Not on file     Inability: Not on file    Transportation needs:     Medical: Not on file     Non-medical: Not on file   Tobacco Use    Smoking status: Former Smoker    Smokeless tobacco: Never Used   Substance and Sexual Activity    Alcohol use:  Yes    Drug use: Not on file    Sexual activity: Not on file   Lifestyle    Physical activity:     Days per week: Not on file     Minutes per session: Not on file    Stress: Not on file   Relationships    Social connections:     Talks on phone: Not on file     Gets together: Not on file     Attends Spiritism service: Not on file     Active member of club or organization: Not on file     Attends meetings of clubs or organizations: Not on file     Relationship status: Not on file    Intimate partner violence:     Fear of current or ex partner: Not on file     Emotionally abused: Not on file     Physically abused: Not on file     Forced sexual activity: Not on file   Other Topics Concern    Not on file   Social History Narrative    Not on file Family History   Problem Relation Age of Onset    Breast Cancer Mother     Cancer Father     Cancer Sister         hodgkins       Current Outpatient Medications   Medication Sig Dispense Refill    tamsulosin (FLOMAX) 0.4 mg capsule TAKE 1 CAPSULE BY MOUTH EVERY DAY 90 Cap 3    dorzolamide (TRUSOPT) 2 % ophthalmic solution INSTILL 1 DROP INTO BOTH EYES TWICE A DAY  2    COMBIGAN 0.2-0.5 % drop ophthalmic solution       AZOPT 1 % ophthalmic suspension       simvastatin (ZOCOR) 20 mg tablet       Cholecalciferol, Vitamin D3, (VITAMIN D3) 1,000 unit cap Take  by mouth.  b complex vitamins (B COMPLEX 1) tablet Take 1 Tab by mouth daily.  multivitamin (ONE A DAY) tablet Take 1 Tab by mouth daily.  omega-3 fatty acids-vitamin e (FISH OIL) 1,000 mg cap Take 1 Cap by mouth.  zinc 50 mg capsule Take  by mouth.  melatonin 3 mg tablet Take  by mouth.  SODIUM CHLORIDE by Does Not Apply route.  cycloSPORINE (RESTASIS) 0.05 % ophthalmic emulsion Administer 1 Drop to both eyes two (2) times a day.  aspirin 81 mg tablet Take 81 mg by mouth.  coenzyme q10 300 mg cap Take  by mouth.  Saccharomyces boulardii (FLORASTOR) 250 mg capsule Take  by mouth.  cyclobenzaprine (FLEXERIL) 5 mg tablet TAKE ONE-HALF TABLET BY MOUTH EVERY DAY AT BEDTIME AS NEEDED FOR PAIN  1    docusate sodium 100 mg tab Take  by mouth.  hydroCHLOROthiazide (HYDRODIURIL) 25 mg tablet TAKE ONE TABLET BY MOUTH ONE TIME DAILY  1    sildenafil, antihypertensive, (REVATIO) 20 mg tablet TAKE 1 TAB BY MOUTH AS NEEDED. TAKE 1-5 TABS AS DIRECTED INDICATIONS: [facility] 40 Tab 0    KLOR-CON M10 10 mEq tablet TAKE 1 TABLET BY MOUTH EVERY DAY  3    tadalafil (CIALIS) 5 mg tablet Take 1 Tab by mouth daily.  Indications: Erectile Dysfunction, SYMPTOMATIC BENIGN PROSTATIC HYPERPLASIA 30 Tab 6    LUMIGAN 0.01 % ophthalmic drops       GLUCOSAM/MSM/CHOND/SLU520/HYAL (GLUCOSAMINE-CHONDROITIN-MSM PO) Take  by mouth.  Flaxseed Oil Oil by Does Not Apply route.  loteprednol etabonate (LOTEMAX) 0.5 % ophthalmic suspension Administer 1 Drop to both eyes four (4) times daily. Past Medical History:   Diagnosis Date    Arthritis     BPH (benign prostatic hyperplasia)     Hemospermia     High blood pressure     High cholesterol     Impotence of organic origin     Inguinal hernia     Insomnia     Rt groin pain     Urge incontinence        Past Surgical History:   Procedure Laterality Date    HX CATARACT REMOVAL      HX HERNIA REPAIR         No Known Allergies    Vital signs:    Visit Vitals  /60 (BP 1 Location: Left arm, BP Patient Position: Sitting)   Pulse 61   Resp 18   Ht 6' (1.829 m)   Wt 94.8 kg (209 lb)   SpO2 94%   BMI 28.35 kg/m²       Review of Systems:   GENERAL: Denies fever or fatigue  CARDIAC: No CP or SOB  PULMONARY: No cough of SOB  MUSCULOSKELETAL: No new joint pain  NEURO: SEE HPI      EXAM: Alert, in NAD. Heart is regular. Oriented x3, EOM's are full, PERRL, no facial asymmetries. Strength and tone are normal. DTR's +2, gait symmetric           Assessment/Plan: Moderate ASH during REM with desats to 84%, responding to CPAP at 8 with a Khang Nasal small mask. Discussed CPAP pitfalls, consequences of untreated ASH, and desensitization techniques to get used to it. Will start at above settings. He will return with another download in 2 months. PLEASE NOTE:   Portions of this document may have been produced using voice recognition software. Unrecognized errors in transcription may be present. This note will not be viewable in [address]

## 2019-07-30 NOTE — PROGRESS NOTES
Otha Eulene Galeazzi. presents today for   Chief Complaint   Patient presents with    Follow-up    CPAP       Is someone accompanying this pt? No    Is the patient using any DME equipment during OV? No    Depression Screening:  3 most recent PHQ Screens 4/10/2019   Little interest or pleasure in doing things Not at all   Feeling down, depressed, irritable, or hopeless Not at all   Total Score PHQ 2 0       Learning Assessment:  No flowsheet data found. Abuse Screening:  No flowsheet data found. Coordination of Care:  1. Have you been to the ER, urgent care clinic since your last visit? Hospitalized since your last visit? No    2. Have you seen or consulted any other health care providers outside of the [address] [place] [address] since your last visit? Include any pap smears or colon screening.  No

## 2019-11-04 ENCOUNTER — HOSPITAL ENCOUNTER (OUTPATIENT)
Dept: PHYSICAL THERAPY | Age: 79
Discharge: HOME | End: 2019-11-04
Payer: MEDICARE

## 2019-11-04 PROCEDURE — 97530 THERAPEUTIC ACTIVITIES: CPT

## 2019-11-04 PROCEDURE — 97162 PT EVAL MOD COMPLEX 30 MIN: CPT

## 2019-11-04 PROCEDURE — 97140 MANUAL THERAPY 1/> REGIONS: CPT

## 2019-11-04 NOTE — PROGRESS NOTES
[facility] - [facility] [employer] [facility]  [address] [place] [address]  [phone] fax    Plan of Care/ Statement of Necessity for Physical Therapy Services    Patient name: Pito Burton. Start of Care: 2019   Referral source: Jamar Funk MD : 1940    Medical Diagnosis: Sciatica, right side [M54.31]  Payor: VA MEDICARE / Plan: VA MEDICARE PART A & B / Product Type: Medicare /  Onset Date:2019    Treatment Diagnosis: LBP, Right LE Pain    Prior Hospitalization: see medical history Provider#: [id number]   Medications: Verified on Patient summary List    Comorbidities: arthritis, urge incontinence    Prior Level of Function: lives with wife in a 1 story home with 4 steps to enter wit handrail, Ind with ambulation, Ind with ADLs and household management, plays bridge       The Plan of Care and following information is based on the information from the initial evaluation. Assessment/ key information: Pt is a 66 yo M who complains of right lumbar pain and pain extending into right LE to mid-calf beginning in 2019 after getting a new car. Pain in right LE is described as throbbing and aching. Subjective reports of pain increased with sitting and relieved with standing. No recent imaging. Pt is TTP with trigger points evident right glute med and right vastus lateralis. He demonstrates mild lumbar AROM deficits throughout and pain is increased in right lumbar region/hip during movement of lumbar flexion with no pain reported at end-range. No centralization or peripheralization of right LE sx with lumbar movement screen. Dermatomes and myotomes screen are WFL B, and mild strength deficits are evident right > left hip. (-) supine SLR, (-) scour, (-) BRENDA, (-) FADDIR, (-) piriformis, and (-) lumbar distraction B.  TPR of right glute med reproduced pain into pt's right LE. Findings consistent with myofascial pain.   Pt will benefit from skilled PT to address strength, ROM, and hypertonicity deficits in order to improve sitting tolerance for increased ease of driving and daily activities. Evaluation Complexity History MEDIUM  Complexity : 1-2 comorbidities / personal factors will impact the outcome/ POC ; Examination HIGH Complexity : 4+ Standardized tests and measures addressing body structure, function, activity limitation and / or participation in recreation  ;Presentation MEDIUM Complexity : Evolving with changing characteristics  ; Clinical Decision Making MEDIUM Complexity : FOTO score of 26-74 - based on clinical judgment   Overall Complexity Rating: MEDIUM  Problem List: pain affecting function, decrease ROM, decrease strength, impaired gait/ balance, decrease ADL/ functional abilitiies, decrease activity tolerance, decrease flexibility/ joint mobility and decrease transfer abilities   Treatment Plan may include any combination of the following: Therapeutic exercise, Therapeutic activities, Neuromuscular re-education, Physical agent/modality, Gait/balance training, Manual therapy, Patient education, Self Care training, Functional mobility training, Home safety training and Stair training  Patient / Family readiness to learn indicated by: asking questions, trying to perform skills and interest  Persons(s) to be included in education: patient (P)  Barriers to Learning/Limitations: None  Patient Goal (s): pain stop  Patient Self Reported Health Status: good  Rehabilitation Potential: good    Short Term Goals: To be accomplished in 1 weeks:  1. Pt will be compliant with initial HEP in order to optimize therapy outcomes. Long Term Goals: To be accomplished in 4 weeks:  1. Pt will improve FOTO by 5 points in order to demonstrate functional improvement. 2. Pt will report 50% improvement in sx in order to improve QOL.   3. Pt will improve sitting tolerance to 1 hour with minimal pain increase in order to improve driving tolerance and ease of visiting family. Frequency / Duration: Patient to be seen 2-3 times per week for 4 weeks. Patient/ Caregiver education and instruction: Diagnosis, prognosis, activity modification and exercises   [x]  Plan of care has been reviewed with PTA    Certification Period: 11/4/19 to 12/3/19   Jeovanny Diaz, PT 11/4/2019 3:14 PM    ________________________________________________________________________    I certify that the above Therapy Services are being furnished while the patient is under my care. I agree with the treatment plan and certify that this therapy is necessary.     Physician's Signature:____________Date:_________TIME:________    ** Signature, Date and Time must be completed for valid certification **    Please sign and return to [facility]  [address] [place] [address]  [phone] fax

## 2019-11-08 ENCOUNTER — HOSPITAL ENCOUNTER (OUTPATIENT)
Dept: PHYSICAL THERAPY | Age: 79
Discharge: HOME | End: 2019-11-08
Payer: MEDICARE

## 2019-11-08 PROCEDURE — 97110 THERAPEUTIC EXERCISES: CPT

## 2019-11-08 NOTE — PROGRESS NOTES
PT DAILY TREATMENT NOTE 10-18    Patient Name: Calvin Contreras. Date:2019  : 1940  [x]  Patient  Verified  Payor: VA MEDICARE / Plan: VA MEDICARE PART A & B / Product Type: Medicare /    In time: 3:42  Out time: 4:19  Total Treatment Time (min): 37  Visit #: 2 of 12    Medicare/BCBS Only   Total Timed Codes (min):  37 1:1 Treatment Time:  10       Treatment Area: Sciatica, right side [M54.31]    SUBJECTIVE  Pain Level (0-10 scale): 0/10  Any medication changes, allergies to medications, adverse drug reactions, diagnosis change, or new procedure performed?: [x] No    [] Yes (see summary sheet for update)  Subjective functional status/changes:   [] No changes reported  Pt reports only pain down the leg with prolonged sitting like in the car. He has been doing his exercises at home. He wishes he had never stopped working out. OBJECTIVE    37 min Therapeutic Exercise:  [x] See flow sheet :   Rationale: increase ROM, increase strength, improve coordination, improve balance and increase proprioception to improve the patients ability to sit without right LE symptoms          With   [] TE   [] TA   [] neuro   [] other: Patient Education: [x] Review HEP    [] Progressed/Changed HEP based on:   [] positioning   [] body mechanics   [] transfers   [] heat/ice application    [] other:      Other Objective/Functional Measures:   No radicular symptoms during session  Added planks for core strengthening  Educated on XI to centralize if he feels symptoms down the leg  Instructed no increase pain with exercises     Pain Level (0-10 scale) post treatment: 0/10    ASSESSMENT/Changes in Function: Initiated exercise program per POC. Pt with slump sitting posture but no radicular symptoms. He only gets symptoms with prolonged sitting suggested poor posture. Will work to improve core and hip strength for decreased pain and improved ease of driving with centralized symptoms. Short Term Goals:  To be accomplished in 1 weeks:  1. Pt will be compliant with initial HEP in order to optimize therapy outcomes. Met  Long Term Goals: To be accomplished in 4 weeks:  1. Pt will improve FOTO by 5 points in order to demonstrate functional improvement. 2. Pt will report 50% improvement in sx in order to improve QOL. 3. Pt will improve sitting tolerance to 1 hour with minimal pain increase in order to improve driving tolerance and ease of visiting family.       PLAN  [x]  Upgrade activities as tolerated     [x]  Continue plan of care  []  Update interventions per flow sheet       []  Discharge due to:_  []  Other:_      Azalea Gaytan, SHARDA 11/8/2019  3:23 PM    Future Appointments   Date Time Provider Narciso Mame   11/8/2019  3:30 PM Uche Javier [facility]   5/6/2020  9:30 AM [facility] [place]   5/13/2020  9:15 AM MD Madhuri Boston

## 2019-11-11 ENCOUNTER — HOSPITAL ENCOUNTER (OUTPATIENT)
Dept: PHYSICAL THERAPY | Age: 79
Discharge: HOME | End: 2019-11-11
Payer: MEDICARE

## 2019-11-11 PROCEDURE — 97110 THERAPEUTIC EXERCISES: CPT

## 2019-11-11 NOTE — PROGRESS NOTES
PT DAILY TREATMENT NOTE 10-18    Patient Name: Kelvin Espinosa. Date:2019  : 1940  [x]  Patient  Verified  Payor: [place] MEDICARE / Plan: [place] MEDICARE PART A & B / Product Type: Medicare /    In time: 1:00  Out time: 1:36  Total Treatment Time (min): 36  Visit #: 3 of 12    Medicare/BCBS Only   Total Timed Codes (min):  36 1:1 Treatment Time: 36       Treatment Area: Sciatica, right side [M54.31]    SUBJECTIVE  Pain Level (0-10 scale): 0/10  Any medication changes, allergies to medications, adverse drug reactions, diagnosis change, or new procedure performed?: [x] No    [] Yes (see summary sheet for update)  Subjective functional status/changes:   [] No changes reported  Pt states only slight discomfort in the right buttock to the back of his right thigh from sitting. He has been doing his exercises at home and doesn't have any questions about them. OBJECTIVE    36 min Therapeutic Exercise:  [x] See flow sheet :   Rationale: increase ROM, increase strength, improve coordination, improve balance and increase proprioception to improve the patients ability to sit without right LE symptoms          With   [] TE   [] TA   [] neuro   [] other: Patient Education: [x] Review HEP    [] Progressed/Changed HEP based on:   [] positioning   [] body mechanics   [] transfers   [] heat/ice application    [] other:      Other Objective/Functional Measures:   Radicular symptoms centralized with repeated extension in standing 2 x 10  Challenged with 30 seconds planks; alternated with prone hip extensions between sets  Max challenged with prone superman  Pt pleased with 3 way bridge series  Worked on scar on core exercises both seated on SB and in standing     Pain Level (0-10 scale) post treatment: 0/10    ASSESSMENT/Changes in Function: Pt able to centralize with extension based exercises. He has decreased core endurance and slump sitting posture.  Will work to improve core/hip strength to improve posture in sitting to maintain centralized symptoms for driving prolonged periods. Short Term Goals: To be accomplished in 1 weeks:  1. Pt will be compliant with initial HEP in order to optimize therapy outcomes. Met  Long Term Goals: To be accomplished in 4 weeks:  1. Pt will improve FOTO by 5 points in order to demonstrate functional improvement. 2. Pt will report 50% improvement in sx in order to improve QOL. 3. Pt will improve sitting tolerance to 1 hour with minimal pain increase in order to improve driving tolerance and ease of visiting family.       PLAN  [x]  Upgrade activities as tolerated     [x]  Continue plan of care  []  Update interventions per flow sheet       []  Discharge due to:_  []  Other:_      Rafita Melchor, PTA 11/11/2019  3:23 PM    Future Appointments   Date Time Provider Narciso Vilchis   11/11/2019  1:00 PM Selena Beatty, PTA [facility]   11/18/2019  3:30 PM Selena Baetty, PTA [facility]   11/21/2019  4:00 PM Selena Beatty, PTA [facility]   11/27/2019  2:30 PM Selena Beatty, PTA [facility]   12/2/2019  3:30 PM Selena Beatty, PTA [facility]   12/5/2019  4:30 PM Brittany Shaffer [facility]   5/6/2020  9:30 AM [facility] NURSE [facility] CARLOS SCHED   5/13/2020  9:15 AM MD Madhuri Sotelo

## 2019-11-18 ENCOUNTER — HOSPITAL ENCOUNTER (OUTPATIENT)
Dept: PHYSICAL THERAPY | Age: 79
Discharge: HOME | End: 2019-11-18
Payer: MEDICARE

## 2019-11-18 PROCEDURE — 97110 THERAPEUTIC EXERCISES: CPT

## 2019-11-18 NOTE — PROGRESS NOTES
PT DAILY TREATMENT NOTE 10-18    Patient Name: Nishi Pump. Date:2019  : 1940  [x]  Patient  Verified  Payor: [place] MEDICARE / Plan: [place] MEDICARE PART A & B / Product Type: Medicare /    In time: 3:30  Out time: 4:05  Total Treatment Time (min): 35  Visit #:4 of 12    Medicare/BCBS Only   Total Timed Codes (min):  35 1:1 Treatment Time: 35       Treatment Area: Sciatica, right side [M54.31]    SUBJECTIVE  Pain Level (0-10 scale): 110  Any medication changes, allergies to medications, adverse drug reactions, diagnosis change, or new procedure performed?: [x] No    [] Yes (see summary sheet for update)  Subjective functional status/changes:   [] No changes reported  Pt reports he has noticed he can sit longer periods now but hasn't timed it. He wants to be able to perform more driving long distance and sitting at the computer without pain. He has been doing more senior workout videos at home. OBJECTIVE    35 min Therapeutic Exercise:  [x] See flow sheet :   Rationale: increase ROM, increase strength, improve coordination, improve balance and increase proprioception to improve the patients ability to sit without right LE symptoms          With   [] TE   [] TA   [] neuro   [] other: Patient Education: [x] Review HEP    [] Progressed/Changed HEP based on:   [] positioning   [] body mechanics   [] transfers   [] heat/ice application    [] other:      Other Objective/Functional Measures: FOTO: 66  No increased pain during session  Fatigued with butt burners  Foam roll on l/s to prevent rotation and excursion with quadruped exercises  Able to perform 30\" plank; increase next session  Added hollow tuck holds for more core strengthening  No radicular symptoms during session    Pain Level (0-10 scale) post treatment: 0/10    ASSESSMENT/Changes in Function:  Mr. Justin Graham is progressing well in therapy with increased sitting tolerance with less radicular symptoms.  He is compliant with exercises and performing other senior videos at home as well. Will continue to progress core strength and endurance to maintain centralized symptoms for ease of driving and working on the computer. Short Term Goals: To be accomplished in 1 weeks:  1. Pt will be compliant with initial HEP in order to optimize therapy outcomes. Met  Long Term Goals: To be accomplished in 4 weeks:  1. Pt will improve FOTO by 5 points in order to demonstrate functional improvement. Initial FOTO completed 66 (11/18/19)  2. Pt will report 50% improvement in sx in order to improve QOL. 3. Pt will improve sitting tolerance to 1 hour with minimal pain increase in order to improve driving tolerance and ease of visiting family.   Progressing subjectively but not formally measured (11/18/19)    PLAN  [x]  Upgrade activities as tolerated     [x]  Continue plan of care  []  Update interventions per flow sheet       []  Discharge due to:_  []  Other:_      Olaf Machado PTA 11/18/2019  3:23 PM    Future Appointments   Date Time Provider Narciso Vilchis   11/18/2019  3:30 PM Laurne Gell, PTA [facility]   11/21/2019  4:00 PM Lauren Gell, PTA [facility]   11/27/2019  2:30 PM Lauren Gell, PTA [facility]   12/2/2019  3:30 PM Lauren Gell, PTA [facility]   12/5/2019  4:30 PM Luis A Esquivel [facility]   5/6/2020  9:30 AM [facility] NURSE [facility] CARLOS SCHED   5/13/2020  9:15 AM MD Madhuri Zacarias

## 2019-11-21 ENCOUNTER — APPOINTMENT (OUTPATIENT)
Dept: PHYSICAL THERAPY | Age: 79
End: 2019-11-21
Payer: MEDICARE

## 2019-11-27 ENCOUNTER — APPOINTMENT (OUTPATIENT)
Dept: PHYSICAL THERAPY | Age: 79
End: 2019-11-27
Payer: MEDICARE

## 2019-12-02 ENCOUNTER — HOSPITAL ENCOUNTER (OUTPATIENT)
Dept: PHYSICAL THERAPY | Age: 79
End: 2019-12-02

## 2019-12-05 ENCOUNTER — APPOINTMENT (OUTPATIENT)
Dept: PHYSICAL THERAPY | Age: 79
End: 2019-12-05

## 2019-12-17 ENCOUNTER — HOSPITAL ENCOUNTER (OUTPATIENT)
Dept: PHYSICAL THERAPY | Age: 79
End: 2019-12-17

## 2020-01-21 ENCOUNTER — APPOINTMENT (OUTPATIENT)
Dept: GENERAL RADIOLOGY | Age: 80
End: 2020-01-21
Attending: EMERGENCY MEDICINE
Payer: MEDICARE

## 2020-01-21 ENCOUNTER — HOSPITAL ENCOUNTER (EMERGENCY)
Age: 80
Discharge: HOME | End: 2020-01-21
Attending: EMERGENCY MEDICINE
Payer: MEDICARE

## 2020-01-21 VITALS
SYSTOLIC BLOOD PRESSURE: 149 MMHG | DIASTOLIC BLOOD PRESSURE: 76 MMHG | TEMPERATURE: 97.8 F | HEART RATE: 68 BPM | OXYGEN SATURATION: 100 % | RESPIRATION RATE: 13 BRPM

## 2020-01-21 DIAGNOSIS — R07.9 CHEST PAIN, UNSPECIFIED TYPE: Primary | ICD-10-CM

## 2020-01-21 LAB
ALBUMIN SERPL-MCNC: 4.1 G/DL (ref 3.4–5)
ALBUMIN/GLOB SERPL: 0.9 {RATIO} (ref 0.8–1.7)
ALP SERPL-CCNC: 99 U/L (ref 45–117)
ALT SERPL-CCNC: 23 U/L (ref 16–61)
ANION GAP SERPL CALC-SCNC: 1 MMOL/L (ref 3–18)
AST SERPL-CCNC: 16 U/L (ref 10–38)
ATRIAL RATE: 44 BPM
ATRIAL RATE: 47 BPM
BASOPHILS # BLD: 0 K/UL (ref 0–0.1)
BASOPHILS NFR BLD: 0 % (ref 0–2)
BILIRUB SERPL-MCNC: 0.9 MG/DL (ref 0.2–1)
BNP SERPL-MCNC: 26 PG/ML (ref 0–1800)
BUN SERPL-MCNC: 19 MG/DL (ref 7–18)
BUN/CREAT SERPL: 17 (ref 12–20)
CALCIUM SERPL-MCNC: 10 MG/DL (ref 8.5–10.1)
CALCULATED P AXIS, ECG09: 26 DEGREES
CALCULATED P AXIS, ECG09: 52 DEGREES
CALCULATED R AXIS, ECG10: -3 DEGREES
CALCULATED R AXIS, ECG10: 2 DEGREES
CALCULATED T AXIS, ECG11: 29 DEGREES
CALCULATED T AXIS, ECG11: 42 DEGREES
CHLORIDE SERPL-SCNC: 110 MMOL/L (ref 100–111)
CK MB CFR SERPL CALC: 1 % (ref 0–4)
CK MB CFR SERPL CALC: 1.1 % (ref 0–4)
CK MB SERPL-MCNC: 1.7 NG/ML (ref 5–25)
CK MB SERPL-MCNC: 1.8 NG/ML (ref 5–25)
CK SERPL-CCNC: 161 U/L (ref 39–308)
CK SERPL-CCNC: 173 U/L (ref 39–308)
CO2 SERPL-SCNC: 31 MMOL/L (ref 21–32)
CREAT SERPL-MCNC: 1.1 MG/DL (ref 0.6–1.3)
DIAGNOSIS, 93000: NORMAL
DIAGNOSIS, 93000: NORMAL
DIFFERENTIAL METHOD BLD: ABNORMAL
EOSINOPHIL # BLD: 0.1 K/UL (ref 0–0.4)
EOSINOPHIL NFR BLD: 2 % (ref 0–5)
ERYTHROCYTE [DISTWIDTH] IN BLOOD BY AUTOMATED COUNT: 13 % (ref 11.6–14.5)
GLOBULIN SER CALC-MCNC: 4.5 G/DL (ref 2–4)
GLUCOSE SERPL-MCNC: 82 MG/DL (ref 74–99)
HCT VFR BLD AUTO: 47.2 % (ref 36–48)
HGB BLD-MCNC: 16 G/DL (ref 13–16)
LYMPHOCYTES # BLD: 1.7 K/UL (ref 0.9–3.6)
LYMPHOCYTES NFR BLD: 25 % (ref 21–52)
MCH RBC QN AUTO: 32.3 PG (ref 24–34)
MCHC RBC AUTO-ENTMCNC: 33.9 G/DL (ref 31–37)
MCV RBC AUTO: 95.2 FL (ref 74–97)
MONOCYTES # BLD: 0.5 K/UL (ref 0.05–1.2)
MONOCYTES NFR BLD: 7 % (ref 3–10)
NEUTS SEG # BLD: 4.6 K/UL (ref 1.8–8)
NEUTS SEG NFR BLD: 66 % (ref 40–73)
P-R INTERVAL, ECG05: 176 MS
P-R INTERVAL, ECG05: 196 MS
PLATELET # BLD AUTO: 183 K/UL (ref 135–420)
PMV BLD AUTO: 12 FL (ref 9.2–11.8)
POTASSIUM SERPL-SCNC: 3.9 MMOL/L (ref 3.5–5.5)
PROT SERPL-MCNC: 8.6 G/DL (ref 6.4–8.2)
Q-T INTERVAL, ECG07: 402 MS
Q-T INTERVAL, ECG07: 410 MS
QRS DURATION, ECG06: 80 MS
QRS DURATION, ECG06: 98 MS
QTC CALCULATION (BEZET), ECG08: 343 MS
QTC CALCULATION (BEZET), ECG08: 362 MS
RBC # BLD AUTO: 4.96 M/UL (ref 4.7–5.5)
SODIUM SERPL-SCNC: 142 MMOL/L (ref 136–145)
TROPONIN I SERPL-MCNC: 0.06 NG/ML (ref 0–0.04)
TROPONIN I SERPL-MCNC: 0.06 NG/ML (ref 0–0.04)
TSH SERPL DL<=0.05 MIU/L-ACNC: 1.24 UIU/ML (ref 0.36–3.74)
VENTRICULAR RATE, ECG03: 44 BPM
VENTRICULAR RATE, ECG03: 47 BPM
WBC # BLD AUTO: 6.9 K/UL (ref 4.6–13.2)

## 2020-01-21 PROCEDURE — 74011250637 HC RX REV CODE- 250/637: Performed by: STUDENT IN AN ORGANIZED HEALTH CARE EDUCATION/TRAINING PROGRAM

## 2020-01-21 PROCEDURE — 85025 COMPLETE CBC W/AUTO DIFF WBC: CPT

## 2020-01-21 PROCEDURE — 99284 EMERGENCY DEPT VISIT MOD MDM: CPT

## 2020-01-21 PROCEDURE — 93005 ELECTROCARDIOGRAM TRACING: CPT

## 2020-01-21 PROCEDURE — 83880 ASSAY OF NATRIURETIC PEPTIDE: CPT

## 2020-01-21 PROCEDURE — 84443 ASSAY THYROID STIM HORMONE: CPT

## 2020-01-21 PROCEDURE — 82550 ASSAY OF CK (CPK): CPT

## 2020-01-21 PROCEDURE — 71045 X-RAY EXAM CHEST 1 VIEW: CPT

## 2020-01-21 PROCEDURE — 80053 COMPREHEN METABOLIC PANEL: CPT

## 2020-01-21 RX ORDER — GUAIFENESIN 100 MG/5ML
324 LIQUID (ML) ORAL
Status: COMPLETED | OUTPATIENT
Start: 2020-01-21 | End: 2020-01-21

## 2020-01-21 RX ADMIN — ASPIRIN 81 MG 324 MG: 81 TABLET ORAL at 16:08

## 2020-01-21 NOTE — ED PROVIDER NOTES
EMERGENCY DEPARTMENT HISTORY AND PHYSICAL EXAM    12:49 PM      Date: 1/21/2020  Patient Name: Ben Jimenez    History of Presenting Illness     Chief Complaint   Patient presents with    Abnormal EKG         History Provided By: Patient  Location/Duration/Severity/Modifying factors   HPI    Mr. Sanchez Lott is a 76-year-old -American male with past medical history significant for hypertension and hyperlipidemia presents after abnormal EKG at PCP's office this morning. Patient states he has been experiencing left chest wall tenderness for the previous 6 months that is been gradually worsening. He states he does lift light weights. Patient describes he is also been experiencing intermittent palpitations for the previous several years, none in the past 48 hours. He states when he presented to his primary care provider's office his heart rate was noted as low. Patient described that his heart rate is always noted to be between 45 and 52 bpm.  Patient denies any chest pain, shortness of breath, nausea, vomiting, fever, chills, left arm pain, jaw pain, leg swelling, cough, or lightheadedness. PCP: Savi Saha MD    Current Outpatient Medications   Medication Sig Dispense Refill    sildenafil, antihypertensive, (REVATIO) 20 mg tablet TAKE 1 TAB BY MOUTH AS NEEDED. TAKE 1-5 TABS AS DIRECTED INDICATIONS: [facility] 90 Tab 0    LINZESS 72 mcg cap capsule       tamsulosin (FLOMAX) 0.4 mg capsule TAKE 1 CAPSULE BY MOUTH EVERY DAY 90 Cap 3    coenzyme q10 300 mg cap Take  by mouth.  Saccharomyces boulardii (FLORASTOR) 250 mg capsule Take  by mouth.  cyclobenzaprine (FLEXERIL) 5 mg tablet TAKE ONE-HALF TABLET BY MOUTH EVERY DAY AT BEDTIME AS NEEDED FOR PAIN  1    docusate sodium 100 mg tab Take  by mouth.       dorzolamide (TRUSOPT) 2 % ophthalmic solution INSTILL 1 DROP INTO BOTH EYES TWICE A DAY  2    hydroCHLOROthiazide (HYDRODIURIL) 25 mg tablet TAKE ONE TABLET BY MOUTH ONE TIME DAILY  1    KLOR-CON M10 10 mEq tablet TAKE 1 TABLET BY MOUTH EVERY DAY  3    LUMIGAN 0.01 % ophthalmic drops       COMBIGAN 0.2-0.5 % drop ophthalmic solution       AZOPT 1 % ophthalmic suspension       simvastatin (ZOCOR) 20 mg tablet       Cholecalciferol, Vitamin D3, (VITAMIN D3) 1,000 unit cap Take  by mouth.  b complex vitamins (B COMPLEX 1) tablet Take 1 Tab by mouth daily.  multivitamin (ONE A DAY) tablet Take 1 Tab by mouth daily.  omega-3 fatty acids-vitamin e (FISH OIL) 1,000 mg cap Take 1 Cap by mouth.  GLUCOSAM/MSM/CHOND/UQR422/HYAL (GLUCOSAMINE-CHONDROITIN-MSM PO) Take  by mouth.  Flaxseed Oil Oil by Does Not Apply route.  zinc 50 mg capsule Take  by mouth.  melatonin 3 mg tablet Take  by mouth.  SODIUM CHLORIDE by Does Not Apply route.  cycloSPORINE (RESTASIS) 0.05 % ophthalmic emulsion Administer 1 Drop to both eyes two (2) times a day.  aspirin 81 mg tablet Take 81 mg by mouth.  loteprednol etabonate (LOTEMAX) 0.5 % ophthalmic suspension Administer 1 Drop to both eyes four (4) times daily. Past History     Past Medical History:  Past Medical History:   Diagnosis Date    Arthritis     BPH (benign prostatic hyperplasia)     Hemospermia     High blood pressure     High cholesterol     Impotence of organic origin     Inguinal hernia     Insomnia     Rt groin pain     Urge incontinence        Past Surgical History:  Past Surgical History:   Procedure Laterality Date    HX CATARACT REMOVAL      HX HERNIA REPAIR         Family History:  Family History   Problem Relation Age of Onset    Breast Cancer Mother     Cancer Father     Cancer Sister         hodgkins       Social History:  Social History     Tobacco Use    Smoking status: Former Smoker    Smokeless tobacco: Never Used   Substance Use Topics    Alcohol use:  Yes    Drug use: Not on file       Allergies:  No Known Allergies      Review of Systems     Review of Systems   Constitutional: Negative for chills, diaphoresis, fatigue and fever. HENT: Negative for congestion and rhinorrhea. Eyes: Negative for pain and redness. Respiratory: Negative for cough, shortness of breath and wheezing. Cardiovascular: Positive for chest pain and palpitations. Negative for leg swelling. Gastrointestinal: Negative for abdominal pain, diarrhea, nausea and vomiting. Genitourinary: Negative for dysuria, flank pain, frequency, hematuria and urgency. Musculoskeletal: Negative for back pain, myalgias, neck pain and neck stiffness. Skin: Negative for pallor. Neurological: Negative for dizziness, syncope, light-headedness, numbness and headaches. Psychiatric/Behavioral: Negative for confusion. Physical Exam     Visit Vitals  /76 (BP 1 Location: Left arm, BP Patient Position: At rest)   Pulse (!) 40   Temp 97 °F (36.1 °C)   Resp 16   SpO2 100%       Physical Exam  Constitutional:       Appearance: Normal appearance. He is normal weight. HENT:      Head: Normocephalic and atraumatic. Nose: Nose normal.      Mouth/Throat:      Mouth: Mucous membranes are moist.   Eyes:      Conjunctiva/sclera: Conjunctivae normal.   Neck:      Musculoskeletal: Normal range of motion and neck supple. Cardiovascular:      Rate and Rhythm: Regular rhythm. Bradycardia present. Pulses: Normal pulses. Heart sounds: Normal heart sounds. Pulmonary:      Effort: Pulmonary effort is normal.      Breath sounds: Normal breath sounds. Chest:      Chest wall: Tenderness present. Abdominal:      General: Abdomen is flat. Bowel sounds are normal.      Palpations: Abdomen is soft. Musculoskeletal:      Right lower leg: No edema. Left lower leg: No edema. Skin:     General: Skin is warm and dry. Capillary Refill: Capillary refill takes less than 2 seconds. Neurological:      Mental Status: He is alert and oriented to person, place, and time.  Mental status is at baseline. Psychiatric:         Mood and Affect: Mood normal.         Behavior: Behavior normal.       Diagnostic Study Results     Labs -  Recent Results (from the past 12 hour(s))   EKG, 12 LEAD, INITIAL    Collection Time: 01/21/20 12:20 PM   Result Value Ref Range    Ventricular Rate 44 BPM    Atrial Rate 44 BPM    P-R Interval 196 ms    QRS Duration 98 ms    Q-T Interval 402 ms    QTC Calculation (Bezet) 343 ms    Calculated P Axis 26 degrees    Calculated R Axis -3 degrees    Calculated T Axis 29 degrees    Diagnosis       Marked sinus bradycardia  Abnormal ECG  No previous ECGs available  Confirmed by Roopa Dawn ([id number]) on 1/21/2020 1:44:56 PM     CBC WITH AUTOMATED DIFF    Collection Time: 01/21/20 12:34 PM   Result Value Ref Range    WBC 6.9 4.6 - 13.2 K/uL    RBC 4.96 4.70 - 5.50 M/uL    HGB 16.0 13.0 - 16.0 g/dL    HCT 47.2 36.0 - 48.0 %    MCV 95.2 74.0 - 97.0 FL    MCH 32.3 24.0 - 34.0 PG    MCHC 33.9 31.0 - 37.0 g/dL    RDW 13.0 11.6 - 14.5 %    PLATELET 478 867 - 061 K/uL    MPV 12.0 (H) 9.2 - 11.8 FL    NEUTROPHILS 66 40 - 73 %    LYMPHOCYTES 25 21 - 52 %    MONOCYTES 7 3 - 10 %    EOSINOPHILS 2 0 - 5 %    BASOPHILS 0 0 - 2 %    ABS. NEUTROPHILS 4.6 1.8 - 8.0 K/UL    ABS. LYMPHOCYTES 1.7 0.9 - 3.6 K/UL    ABS. MONOCYTES 0.5 0.05 - 1.2 K/UL    ABS. EOSINOPHILS 0.1 0.0 - 0.4 K/UL    ABS.  BASOPHILS 0.0 0.0 - 0.1 K/UL    DF AUTOMATED     METABOLIC PANEL, COMPREHENSIVE    Collection Time: 01/21/20 12:34 PM   Result Value Ref Range    Sodium 142 136 - 145 mmol/L    Potassium 3.9 3.5 - 5.5 mmol/L    Chloride 110 100 - 111 mmol/L    CO2 31 21 - 32 mmol/L    Anion gap 1 (L) 3.0 - 18 mmol/L    Glucose 82 74 - 99 mg/dL    BUN 19 (H) 7.0 - 18 MG/DL    Creatinine 1.10 0.6 - 1.3 MG/DL    BUN/Creatinine ratio 17 12 - 20      GFR est AA >60 >60 ml/min/1.73m2    GFR est non-AA >60 >60 ml/min/1.73m2    Calcium 10.0 8.5 - 10.1 MG/DL    Bilirubin, total 0.9 0.2 - 1.0 MG/DL    ALT (SGPT) 23 16 - 61 U/L    AST (SGOT) 16 10 - 38 U/L    Alk. phosphatase 99 45 - 117 U/L    Protein, total 8.6 (H) 6.4 - 8.2 g/dL    Albumin 4.1 3.4 - 5.0 g/dL    Globulin 4.5 (H) 2.0 - 4.0 g/dL    A-G Ratio 0.9 0.8 - 1.7     CARDIAC PANEL,(CK, CKMB & TROPONIN)    Collection Time: 01/21/20 12:34 PM   Result Value Ref Range     39 - 308 U/L    CK - MB 1.8 <3.6 ng/ml    CK-MB Index 1.0 0.0 - 4.0 %    Troponin-I, QT 0.06 (H) 0.0 - 0.045 NG/ML   NT-PRO BNP    Collection Time: 01/21/20 12:34 PM   Result Value Ref Range    NT pro-BNP 26 0 - 1,800 PG/ML   TSH 3RD GENERATION    Collection Time: 01/21/20 12:34 PM   Result Value Ref Range    TSH 1.24 0.36 - 3.74 uIU/mL   EKG, 12 LEAD, SUBSEQUENT    Collection Time: 01/21/20  2:53 PM   Result Value Ref Range    Ventricular Rate 47 BPM    Atrial Rate 47 BPM    P-R Interval 176 ms    QRS Duration 80 ms    Q-T Interval 410 ms    QTC Calculation (Bezet) 362 ms    Calculated P Axis 52 degrees    Calculated R Axis 2 degrees    Calculated T Axis 42 degrees    Diagnosis       Sinus bradycardia  Otherwise normal ECG  When compared with ECG of 21-JAN-2020 12:20,  No significant change was found  Confirmed by Kerri Finley ([id number]) on 1/21/2020 3:03:39 PM     CARDIAC PANEL,(CK, CKMB & TROPONIN)    Collection Time: 01/21/20  3:30 PM   Result Value Ref Range     39 - 308 U/L    CK - MB 1.7 <3.6 ng/ml    CK-MB Index 1.1 0.0 - 4.0 %    Troponin-I, QT 0.06 (H) 0.0 - 0.045 NG/ML       Radiologic Studies -   XR CHEST PORT   Final Result   IMPRESSION: No acute pulmonary disease            Medical Decision Making   I am the first provider for this patient. I reviewed the vital signs, available nursing notes, past medical history, past surgical history, family history and social history. Vital Signs-Reviewed the patient's vital signs. EKG: Sinus bradycardia. 44 bpm.  No evidence of acute ischemia.     Records Reviewed: Nursing Notes, Old Medical Records, Previous Radiology Studies and Previous Laboratory Studies (Time of Review: 12:49 PM)    ED Course: Progress Notes, Reevaluation, and Consults:    1:19 PM Patient seen and evaluated. No acute distress. Patient sent by [facility] due to bradycardia and continued chest wall pain. Patient's EKG 44 bpm normal sinus rhythm, no evidence of ischemia. On evaluation patient states his heart rate is always between 40 and 52 bpm.  2:45 PM patient's troponin elevated at 0.06. With outpatient cardiac monitor order repeat EKG and page cardiology. 4:15 PM Cardiology paged. 4:47 PM Discussed case with Dr. Rosa M Baer, cardiology, who states patient can follow-up outpatient in office and should call for appointment. Patient to be discharged to home. Provider Notes (Medical Decision Making):   [facility]     Mr. Court Cunningham is a 78-year-old -American male with past medical history significant for hypertension and hyperlipidemia presents after abnormal EKG at PCP's office this morning. Patient states he has been experiencing left chest wall tenderness for the previous 6 months that is been gradually worsening. He states when he presented to his primary care provider's office his heart rate was noted as low. Patient described that his heart rate is always noted to be between 45 and 52 bpm.  On arrival patient's pulse was 40 bpm.  He states this is normal for him. Serial EKG showed no evidence of acute ischemia, patient continues to be in sinus bradycardia. CBC and CMP were unremarkable. Chest x-ray negative. Initial troponin elevated at 0.06. Repeat was also 0.06. Patient states he is completely asymptomatic at this time. Previous chest pain had been ongoing 6 months. Discussed case with Dr. Rosa M Baer, cardiology on-call, who is in agreement to have patient discharged home call for follow-up appointment as outpatient. Patient was instructed to continue taking 81 mg of aspirin daily and to follow-up with his PCP. Return precautions discussed. Patient discharged home.  Bradycardia has now resolved, patient asymptomatic. ATTENDING ATTESTATION:  Patient with chest pain and chest wall tenderness, HS:2. No ischemia on EKG, no elevation of repeat troponin, chest pain for 2 months, patient will be discharged with PMD and cardiology f/u. I personally saw and examined the patient. I have reviewed and agree with the residents findings, including all diagnostic interpretations, and plans as written. I was present during the key portions of separately billed procedures. Vannessa Hollis MD        Diagnosis     Clinical Impression:   1. Chest pain, unspecified type    2) Elevated Troponin; Stable    Disposition: Discharged Home    Follow-up Information     Follow up With Specialties Details Why Contact Info    Jarett Duron MD [facility] Schedule an appointment as soon as possible for a visit in 3 days  [place] [address] [address] [facility]      Veto Maya MD Cardiology Schedule an appointment as soon as possible for a visit in 3 days  [address] [place] [address]  [phone]             Patient's Medications   Start Taking    No medications on file   Continue Taking    ASPIRIN 81 MG TABLET    Take 81 mg by mouth. AZOPT 1 % OPHTHALMIC SUSPENSION        B COMPLEX VITAMINS (B COMPLEX 1) TABLET    Take 1 Tab by mouth daily. CHOLECALCIFEROL, VITAMIN D3, (VITAMIN D3) 1,000 UNIT CAP    Take  by mouth. COENZYME Q10 300 MG CAP    Take  by mouth. COMBIGAN 0.2-0.5 % DROP OPHTHALMIC SOLUTION        CYCLOBENZAPRINE (FLEXERIL) 5 MG TABLET    TAKE ONE-HALF TABLET BY MOUTH EVERY DAY AT BEDTIME AS NEEDED FOR PAIN    CYCLOSPORINE (RESTASIS) 0.05 % OPHTHALMIC EMULSION    Administer 1 Drop to both eyes two (2) times a day. DOCUSATE SODIUM 100 MG TAB    Take  by mouth. DORZOLAMIDE (TRUSOPT) 2 % OPHTHALMIC SOLUTION    INSTILL 1 DROP INTO BOTH EYES TWICE A DAY    FLAXSEED OIL OIL    by Does Not Apply route.     GLUCOSAM/MSM/CHOND/FYD091/HYAL (GLUCOSAMINE-CHONDROITIN-MSM PO)    Take  by mouth. HYDROCHLOROTHIAZIDE (HYDRODIURIL) 25 MG TABLET    TAKE ONE TABLET BY MOUTH ONE TIME DAILY    KLOR-CON M10 10 MEQ TABLET    TAKE 1 TABLET BY MOUTH EVERY DAY    LINZESS 72 MCG CAP CAPSULE        LOTEPREDNOL ETABONATE (LOTEMAX) 0.5 % OPHTHALMIC SUSPENSION    Administer 1 Drop to both eyes four (4) times daily. LUMIGAN 0.01 % OPHTHALMIC DROPS        MELATONIN 3 MG TABLET    Take  by mouth. MULTIVITAMIN (ONE A DAY) TABLET    Take 1 Tab by mouth daily. OMEGA-3 FATTY ACIDS-VITAMIN E (FISH OIL) 1,000 MG CAP    Take 1 Cap by mouth. SACCHAROMYCES BOULARDII (FLORASTOR) 250 MG CAPSULE    Take  by mouth. SILDENAFIL, ANTIHYPERTENSIVE, (REVATIO) 20 MG TABLET    TAKE 1 TAB BY MOUTH AS NEEDED. TAKE 1-5 TABS AS DIRECTED INDICATIONS: [facility]    SIMVASTATIN (ZOCOR) 20 MG TABLET        SODIUM CHLORIDE    by Does Not Apply route. TAMSULOSIN (FLOMAX) 0.4 MG CAPSULE    TAKE 1 CAPSULE BY MOUTH EVERY DAY    ZINC 50 MG CAPSULE    Take  by mouth. These Medications have changed    No medications on file   Stop Taking    No medications on file     Disclaimer: Sections of this note are dictated using utilizing voice recognition software. Minor typographical errors may be present. If questions arise, please do not hesitate to contact me or call our department.

## 2020-01-21 NOTE — ED NOTES
I have reviewed discharge instructions with the patient. The patient verbalized understanding. Patient ambuulatory to the ED lobby exit without assistance. No obvious distress noted.

## 2020-01-24 ENCOUNTER — OFFICE VISIT (OUTPATIENT)
Dept: CARDIOLOGY CLINIC | Age: 80
End: 2020-01-24

## 2020-01-24 VITALS
HEIGHT: 72 IN | HEART RATE: 52 BPM | SYSTOLIC BLOOD PRESSURE: 103 MMHG | WEIGHT: 209 LBS | DIASTOLIC BLOOD PRESSURE: 63 MMHG | BODY MASS INDEX: 28.31 KG/M2

## 2020-01-24 DIAGNOSIS — R74.8 ABNORMAL CARDIAC ENZYME LEVEL: ICD-10-CM

## 2020-01-24 DIAGNOSIS — E78.00 HYPERCHOLESTEREMIA: ICD-10-CM

## 2020-01-24 DIAGNOSIS — R07.89 OTHER CHEST PAIN: Primary | ICD-10-CM

## 2020-01-24 RX ORDER — TRAZODONE HYDROCHLORIDE 50 MG/1
TABLET ORAL
COMMUNITY
End: 2020-05-27

## 2020-01-24 NOTE — PROGRESS NOTES
HISTORY OF PRESENT ILLNESS  Oli Grant. is a 67 y.o. male. New Patient   The history is provided by the patient and medical records (er f/u ). This is a chronic problem. Associated symptoms include chest pain. Pertinent negatives include no headaches and no shortness of breath. Chest Pain (Angina)    The history is provided by the patient. This is a new problem. The current episode started more than 1 week ago (8/19). The problem occurs constantly. The pain is associated with rest and exertion (Lifting weights but not walking). The pain is present in the lateral region and left side. The pain does not radiate. Associated symptoms include dizziness. Pertinent negatives include no claudication, no cough, no diaphoresis, no fever, no headaches, no malaise/fatigue, no nausea, no orthopnea, no palpitations, no PND, no shortness of breath and no vomiting. Dizziness   The history is provided by the patient. This is a new problem. The current episode started more than 1 week ago (yrs). Associated symptoms include chest pain. Pertinent negatives include no headaches and no shortness of breath. The symptoms are aggravated by standing and bending (lying down). Review of Systems   Constitutional: Negative for chills, diaphoresis, fever, malaise/fatigue and weight loss. HENT: Negative for nosebleeds. Eyes: Negative for discharge. Respiratory: Negative for cough, shortness of breath and wheezing. Cardiovascular: Positive for chest pain. Negative for palpitations, orthopnea, claudication, leg swelling and PND. Gastrointestinal: Negative for diarrhea, nausea and vomiting. Genitourinary: Negative for dysuria and hematuria. Musculoskeletal: Negative for joint pain. Skin: Negative for rash. Neurological: Positive for dizziness. Negative for seizures, loss of consciousness and headaches. Endo/Heme/Allergies: Negative for polydipsia. Does not bruise/bleed easily.    Psychiatric/Behavioral: Negative for depression and substance abuse. The patient does not have insomnia. No Known Allergies    Past Medical History:   Diagnosis Date    Arthritis     BPH (benign prostatic hyperplasia)     Hemorrhoids     Hemospermia     High cholesterol     Impotence of organic origin     Inguinal hernia     Insomnia     Rt groin pain     Urge incontinence        Family History   Problem Relation Age of Onset    Breast Cancer Mother     Cancer Father     Stroke Father     Heart Attack Father     Cancer Sister         hodgkins    Coronary Artery Disease Sister        Social History     Tobacco Use    Smoking status: Former Smoker     Last attempt to quit: 1985     Years since quittin.0    Smokeless tobacco: Never Used   Substance Use Topics    Alcohol use: Yes     Alcohol/week: 3.0 standard drinks     Types: 2 Glasses of wine, 1 Shots of liquor per week    Drug use: Not on file        Current Outpatient Medications   Medication Sig    traZODone (DESYREL) 50 mg tablet Take  by mouth nightly.  sildenafil, antihypertensive, (REVATIO) 20 mg tablet TAKE 1 TAB BY MOUTH AS NEEDED. TAKE 1-5 TABS AS DIRECTED INDICATIONS: [facility]    LINZESS 72 mcg cap capsule as needed.  tamsulosin (FLOMAX) 0.4 mg capsule TAKE 1 CAPSULE BY MOUTH EVERY DAY    coenzyme q10 300 mg cap Take  by mouth.  cyclobenzaprine (FLEXERIL) 5 mg tablet TAKE ONE-HALF TABLET BY MOUTH EVERY DAY AT BEDTIME AS NEEDED FOR PAIN    docusate sodium 100 mg tab Take  by mouth.  dorzolamide (TRUSOPT) 2 % ophthalmic solution INSTILL 1 DROP INTO BOTH EYES TWICE A DAY    KLOR-CON M10 10 mEq tablet TAKE 1 TABLET BY MOUTH EVERY DAY    LUMIGAN 0.01 % ophthalmic drops     COMBIGAN 0.2-0.5 % drop ophthalmic solution     AZOPT 1 % ophthalmic suspension     simvastatin (ZOCOR) 20 mg tablet     Cholecalciferol, Vitamin D3, (VITAMIN D3) 1,000 unit cap Take  by mouth.     b complex vitamins (B COMPLEX 1) tablet Take 1 Tab by mouth daily.  multivitamin (ONE A DAY) tablet Take 1 Tab by mouth daily.  omega-3 fatty acids-vitamin e (FISH OIL) 1,000 mg cap Take 1 Cap by mouth.  GLUCOSAM/MSM/CHOND/AGE004/HYAL (GLUCOSAMINE-CHONDROITIN-MSM PO) Take  by mouth.  Flaxseed Oil Oil by Does Not Apply route.  melatonin 3 mg tablet Take  by mouth as needed.  cycloSPORINE (RESTASIS) 0.05 % ophthalmic emulsion Administer 1 Drop to both eyes two (2) times a day.  aspirin 81 mg tablet Take 81 mg by mouth. 2 tabs daily    loteprednol etabonate (LOTEMAX) 0.5 % ophthalmic suspension Administer 1 Drop to both eyes four (4) times daily.  Saccharomyces boulardii (FLORASTOR) 250 mg capsule Take  by mouth. No current facility-administered medications for this visit. Past Surgical History:   Procedure Laterality Date    HX CATARACT REMOVAL      HX HERNIA REPAIR      HX KNEE ARTHROSCOPY      HX TURP         Visit Vitals  /63   Pulse (!) 52   Ht 6' (1.829 m)   Wt 94.8 kg (209 lb)   BMI 28.35 kg/m²       Diagnostic Studies:  I have reviewed the relevant tests done on the patient and show as follows  EKG tracings reviewed by me today. EKG Results     None        XR Results (most recent):  Results from Hospital Encounter encounter on 01/21/20   XR CHEST PORT    Narrative Portable CXR:    COMPARISON: None    HISTORY: Chest pain. Cardiac silhouette is normal in size with mild tortuosity of the thoracic aorta  and mild aortic arch calcification. No vascular congestion. Lungs and  costophrenic angles are clear. Impression IMPRESSION: No acute pulmonary disease                      No flowsheet data found. Mr. Court Cunningham has a reminder for a \"due or due soon\" health maintenance. I have asked that he contact his primary care provider for follow-up on this health maintenance. Physical Exam   Constitutional: He is oriented to person, place, and time. He appears well-developed and well-nourished. No distress. HENT:   Head: Normocephalic and atraumatic. Mouth/Throat: Normal dentition. Eyes: Right eye exhibits no discharge. Left eye exhibits no discharge. No scleral icterus. Neck: Neck supple. No JVD present. Carotid bruit is not present. No thyromegaly present. Cardiovascular: Normal rate, regular rhythm, S1 normal, S2 normal, normal heart sounds and intact distal pulses. Exam reveals no gallop and no friction rub. No murmur heard. Pulmonary/Chest: Effort normal and breath sounds normal. He has no wheezes. He has no rales. Abdominal: Soft. He exhibits no mass. There is no tenderness. Musculoskeletal:         General: No edema. Lymphadenopathy:        Right cervical: No superficial cervical adenopathy present. Left cervical: No superficial cervical adenopathy present. Neurological: He is alert and oriented to person, place, and time. Skin: Skin is warm and dry. No rash noted. Psychiatric: He has a normal mood and affect. His behavior is normal.       ASSESSMENT and PLAN    Chest pain is atypical and likely noncardiac but considering abnormal cardiac enzymes and age of the patient, would recommend stress test to evaluate ischemia would prefer an imaging test because of his age. History of hypertension in the chart is probably not correct as he is off medications for last 2 years and pressures have been okay. Continue statins for hyperlipidemia. Diagnoses and all orders for this visit:    1. Other chest pain  -     NUCLEAR CARDIAC STRESS TEST; Future    2. Hypercholesteremia    3. Abnormal cardiac enzyme level        Pertinent laboratory and test data reviewed and discussed with patient.   See patient instructions also for other medical advice given    Medications Discontinued During This Encounter   Medication Reason    SODIUM CHLORIDE Therapy Completed    zinc 50 mg capsule Therapy Completed    hydroCHLOROthiazide (HYDRODIURIL) 25 mg tablet        Follow-up and Dispositions · Return in about 1 week (around 1/31/2020), or if symptoms worsen or fail to improve, for same day post test.

## 2020-01-24 NOTE — PATIENT INSTRUCTIONS
Medications Discontinued During This Encounter Medication Reason  SODIUM CHLORIDE Therapy Completed  zinc 50 mg capsule Therapy Completed  hydroCHLOROthiazide (HYDRODIURIL) 25 mg tablet Chest Pain: Care Instructions Your Care Instructions There are many things that can cause chest pain. Some are not serious and will get better on their own in a few days. But some kinds of chest pain need more testing and treatment. Your doctor may have recommended a follow-up visit in the next 8 to 12 hours. If you are not getting better, you may need more tests or treatment. Even though your doctor has released you, you still need to watch for any problems. The doctor carefully checked you, but sometimes problems can develop later. If you have new symptoms or if your symptoms do not get better, get medical care right away. If you have worse or different chest pain or pressure that lasts more than 5 minutes or you passed out (lost consciousness), call 911 or seek other emergency help right away. A medical visit is only one step in your treatment. Even if you feel better, you still need to do what your doctor recommends, such as going to all suggested follow-up appointments and taking medicines exactly as directed. This will help you recover and help prevent future problems. How can you care for yourself at home? · Rest until you feel better. · Take your medicine exactly as prescribed. Call your doctor if you think you are having a problem with your medicine. · Do not drive after taking a prescription pain medicine. When should you call for help? Call 911 if: 
  · You passed out (lost consciousness).  
  · You have severe difficulty breathing.  
  · You have symptoms of a heart attack. These may include: 
? Chest pain or pressure, or a strange feeling in your chest. 
? Sweating. ? Shortness of breath. ? Nausea or vomiting.  
? Pain, pressure, or a strange feeling in your back, neck, jaw, or upper belly or in one or both shoulders or arms. ? Lightheadedness or sudden weakness. ? A fast or irregular heartbeat. After you call 911, the  may tell you to chew 1 adult-strength or 2 to 4 low-dose aspirin. Wait for an ambulance. Do not try to drive yourself.  
 Call your doctor today if: 
  · You have any trouble breathing.  
  · Your chest pain gets worse.  
  · You are dizzy or lightheaded, or you feel like you may faint.  
  · You are not getting better as expected.  
  · You are having new or different chest pain. Where can you learn more? Go to http://kely-anshul.info/. Enter A120 in the search box to learn more about \"Chest Pain: Care Instructions. \" Current as of: June 26, 2019 Content Version: 12.2 © 5461-7541 [employer]. Care instructions adapted under license by [email or web address] (which disclaims liability or warranty for this information). If you have questions about a medical condition or this instruction, always ask your healthcare professional. [address] any warranty or liability for your use of this information.

## 2020-01-27 ENCOUNTER — OFFICE VISIT (OUTPATIENT)
Dept: CARDIOLOGY CLINIC | Age: 80
End: 2020-01-27

## 2020-01-27 VITALS
HEART RATE: 56 BPM | OXYGEN SATURATION: 97 % | SYSTOLIC BLOOD PRESSURE: 136 MMHG | WEIGHT: 209 LBS | HEIGHT: 72 IN | DIASTOLIC BLOOD PRESSURE: 67 MMHG | BODY MASS INDEX: 28.31 KG/M2

## 2020-01-27 DIAGNOSIS — R07.89 OTHER CHEST PAIN: Primary | ICD-10-CM

## 2020-01-27 DIAGNOSIS — E78.00 HYPERCHOLESTEREMIA: ICD-10-CM

## 2020-01-27 DIAGNOSIS — R42 DIZZINESS: ICD-10-CM

## 2020-01-27 NOTE — PATIENT INSTRUCTIONS
There are no discontinued medications. Here is your stress test report from today. 01/27/20 NUCLEAR CARDIAC STRESS TEST 01/27/2020 1/27/2020 Narrative · Baseline ECG: Sinus bradycardia, non-specific ST-T wave abnormalities. · Negative stress test. 
· Moderate risk Duke treadmill score. · Gated SPECT: Left ventricular function post-stress was normal.  
Calculated ejection fraction is 56%. There is no evidence of transient  
ischemic dilation (TID). The TID ratio is 1.05. · Left ventricular perfusion is normal. 
· Negative myocardial perfusion imaging. Myocardial perfusion imaging  
supports a low risk stress test. 
   
  Signed by: Sophie Moore MD  
 
  
 
  
 
  
A Healthy Heart: Care Instructions Your Care Instructions Heart disease occurs when a substance called plaque builds up in the vessels that supply oxygen-rich blood to your heart. This can narrow the blood vessels and reduce blood flow. A heart attack happens when blood flow is completely blocked. A high-fat diet, smoking, and other factors increase the risk of heart disease. Your doctor has found that you have a chance of having heart disease. You can do lots of things to keep your heart healthy. It may not be easy, but you can change your diet, exercise more, and quit smoking. These steps really work to lower your chance of heart disease. Follow-up care is a key part of your treatment and safety. Be sure to make and go to all appointments, and call your doctor if you are having problems. It's also a good idea to know your test results and keep a list of the medicines you take. How can you care for yourself at home? Diet 
  · Use less salt when you cook and eat. This helps lower your blood pressure. Taste food before salting. Add only a little salt when you think you need it.  With time, your taste buds will adjust to less salt.  
  · Eat fewer snack items, fast foods, canned soups, and other high-salt, high-fat, processed foods.  
  · Read food labels and try to avoid saturated and trans fats. They increase your risk of heart disease by raising cholesterol levels.  
  · Limit the amount of solid fat-butter, margarine, and shortening-you eat. Use olive, peanut, or canola oil when you cook. Bake, broil, and steam foods instead of frying them.  
  · Eating fish can lower your risk for heart disease. Eat at least 2 servings of fish a week. Tyro, mackerel, herring, sardines, and chunk light tuna are very good choices. These fish contain omega-3 fatty acids.  
  · Eat a variety of fruit and vegetables every day. Dark green, deep orange, red, or yellow fruits and vegetables are especially good for you. Examples include spinach, carrots, peaches, and berries.  
  · Foods high in fiber can reduce your cholesterol and provide important vitamins and minerals. High-fiber foods include whole-grain cereals and breads, oatmeal, beans, brown rice, citrus fruits, and apples.  
  · Limit drinks and foods with added sugar. These include candy, desserts, and soda pop.  
 Lifestyle changes 
  · If your doctor recommends it, get more exercise. Walking is a good choice. Bit by bit, increase the amount you walk every day. Try for at least 30 minutes on most days of the week. You also may want to swim, bike, or do other activities.  
  · Do not smoke. If you need help quitting, talk to your doctor about stop-smoking programs and medicines. These can increase your chances of quitting for good. Quitting smoking may be the most important step you can take to protect your heart. It is never too late to quit. You will get health benefits right away.  
  · Limit alcohol to 2 drinks a day for men and 1 drink a day for women. Too much alcohol can cause health problems. Medicines 
  · Take your medicines exactly as prescribed. Call your doctor if you think you are having a problem with your medicine.   · If your doctor recommends aspirin, take the amount directed each day. Make sure you take aspirin and not another kind of pain reliever, such as acetaminophen (Tylenol). If you take ibuprofen (such as Advil or Motrin) for other problems, take aspirin at least 2 hours before taking ibuprofen. When should you call for help? Call 911 if you have symptoms of a heart attack. These may include: 
  · Chest pain or pressure, or a strange feeling in the chest.  
  · Sweating.  
  · Shortness of breath.  
  · Pain, pressure, or a strange feeling in the back, neck, jaw, or upper belly or in one or both shoulders or arms.  
  · Lightheadedness or sudden weakness.  
  · A fast or irregular heartbeat.  
 After you call 911, the  may tell you to chew 1 adult-strength or 2 to 4 low-dose aspirin. Wait for an ambulance. Do not try to drive yourself. 
 Watch closely for changes in your health, and be sure to contact your doctor if you have any problems. Where can you learn more? Go to http://kely-anshul.info/. Enter K428 in the search box to learn more about \"A Healthy Heart: Care Instructions. \" Current as of: April 9, 2019 Content Version: 12.2 © 2004-5986 [employer]. Care instructions adapted under license by [employer] (which disclaims liability or warranty for this information). If you have questions about a medical condition or this instruction, always ask your healthcare professional. [place] [address] any warranty or liability for your use of this information.

## 2020-01-27 NOTE — PROGRESS NOTES
1. Have you been to the ER, urgent care clinic since your last visit? Hospitalized since your last visit? No    2. Have you seen or consulted any other health care providers outside of the [address] [place] [address] since your last visit? Include any pap smears or colon screening. No     3. Since your last visit, have you had any of the following symptoms? chest pains and dizziness. 4.  Have you had any blood work, X-rays or cardiac testing? Yes Where: In Office Reason for visit: Stress Test      5. Where do you normally have your labs drawn? PCP Office      6. Do you need any refills today?    No

## 2020-01-27 NOTE — PROGRESS NOTES
HISTORY OF PRESENT ILLNESS  Oli Medrano is a 67 y.o. male. Chest Pain (Angina)    The history is provided by the patient. This is a new problem. The current episode started more than 1 week ago (8/19). The problem has not changed since onset. The problem occurs constantly. The pain is associated with rest and exertion (Lifting weights but not walking). The pain is present in the lateral region and left side. The pain does not radiate. Associated symptoms include dizziness. Pertinent negatives include no claudication, no cough, no diaphoresis, no fever, no headaches, no malaise/fatigue, no nausea, no orthopnea, no palpitations, no PND, no shortness of breath and no vomiting. Dizziness   The history is provided by the patient. This is a new problem. The current episode started more than 1 week ago (yrs). Associated symptoms include chest pain. Pertinent negatives include no headaches and no shortness of breath. The symptoms are aggravated by standing and bending (lying down). Follow-up   The history is provided by the patient (After the stress test). Associated symptoms include chest pain. Pertinent negatives include no headaches and no shortness of breath. Cholesterol Problem   The history is provided by the medical records. This is a chronic problem. Associated symptoms include chest pain. Pertinent negatives include no headaches and no shortness of breath. Review of Systems   Constitutional: Negative for chills, diaphoresis, fever, malaise/fatigue and weight loss. HENT: Negative for nosebleeds. Eyes: Negative for discharge. Respiratory: Negative for cough, shortness of breath and wheezing. Cardiovascular: Positive for chest pain. Negative for palpitations, orthopnea, claudication, leg swelling and PND. Gastrointestinal: Negative for diarrhea, nausea and vomiting. Genitourinary: Negative for dysuria and hematuria. Musculoskeletal: Negative for joint pain.    Skin: Negative for rash.   Neurological: Positive for dizziness. Negative for seizures, loss of consciousness and headaches. Endo/Heme/Allergies: Negative for polydipsia. Does not bruise/bleed easily. Psychiatric/Behavioral: Negative for depression and substance abuse. The patient does not have insomnia. No Known Allergies    Past Medical History:   Diagnosis Date    Arthritis     BPH (benign prostatic hyperplasia)     Hemorrhoids     Hemospermia     High cholesterol     Impotence of organic origin     Inguinal hernia     Insomnia     Rt groin pain     Urge incontinence        Family History   Problem Relation Age of Onset    Breast Cancer Mother     Cancer Father     Stroke Father     Heart Attack Father     Cancer Sister         hodgkins    Coronary Artery Disease Sister        Social History     Tobacco Use    Smoking status: Former Smoker     Last attempt to quit: 1985     Years since quittin.0    Smokeless tobacco: Never Used   Substance Use Topics    Alcohol use: Yes     Alcohol/week: 3.0 standard drinks     Types: 2 Glasses of wine, 1 Shots of liquor per week     Frequency: 2-3 times a week     Drinks per session: 1 or 2     Binge frequency: Never    Drug use: Never        Current Outpatient Medications   Medication Sig    traZODone (DESYREL) 50 mg tablet Take  by mouth nightly.  sildenafil, antihypertensive, (REVATIO) 20 mg tablet TAKE 1 TAB BY MOUTH AS NEEDED. TAKE 1-5 TABS AS DIRECTED INDICATIONS: [facility]    LINZESS 72 mcg cap capsule as needed.  tamsulosin (FLOMAX) 0.4 mg capsule TAKE 1 CAPSULE BY MOUTH EVERY DAY    coenzyme q10 300 mg cap Take  by mouth.  Saccharomyces boulardii (FLORASTOR) 250 mg capsule Take  by mouth.  cyclobenzaprine (FLEXERIL) 5 mg tablet TAKE ONE-HALF TABLET BY MOUTH EVERY DAY AT BEDTIME AS NEEDED FOR PAIN    docusate sodium 100 mg tab Take  by mouth.     dorzolamide (TRUSOPT) 2 % ophthalmic solution INSTILL 1 DROP INTO BOTH EYES TWICE A DAY    KLOR-CON M10 10 mEq tablet TAKE 1 TABLET BY MOUTH EVERY DAY    LUMIGAN 0.01 % ophthalmic drops     COMBIGAN 0.2-0.5 % drop ophthalmic solution     AZOPT 1 % ophthalmic suspension     simvastatin (ZOCOR) 20 mg tablet     Cholecalciferol, Vitamin D3, (VITAMIN D3) 1,000 unit cap Take  by mouth.  b complex vitamins (B COMPLEX 1) tablet Take 1 Tab by mouth daily.  multivitamin (ONE A DAY) tablet Take 1 Tab by mouth daily.  omega-3 fatty acids-vitamin e (FISH OIL) 1,000 mg cap Take 1 Cap by mouth.  GLUCOSAM/MSM/CHOND/PYG662/HYAL (GLUCOSAMINE-CHONDROITIN-MSM PO) Take  by mouth.  Flaxseed Oil Oil by Does Not Apply route.  melatonin 3 mg tablet Take  by mouth as needed.  cycloSPORINE (RESTASIS) 0.05 % ophthalmic emulsion Administer 1 Drop to both eyes two (2) times a day.  aspirin 81 mg tablet Take 81 mg by mouth. 2 tabs daily    loteprednol etabonate (LOTEMAX) 0.5 % ophthalmic suspension Administer 1 Drop to both eyes four (4) times daily. No current facility-administered medications for this visit. Past Surgical History:   Procedure Laterality Date    HX CATARACT REMOVAL      HX HERNIA REPAIR      HX KNEE ARTHROSCOPY      HX TURP         Visit Vitals  /67 (BP 1 Location: Left arm, BP Patient Position: Sitting)   Pulse (!) 56   Ht 6' (1.829 m)   Wt 94.8 kg (209 lb)   SpO2 97%   BMI 28.35 kg/m²       Diagnostic Studies:  I have reviewed the relevant tests done on the patient and show as follows  EKG tracings reviewed by me today. EKG Results     None        XR Results (most recent):  Results from Hospital Encounter encounter on 01/21/20   XR CHEST PORT    Narrative Portable CXR:    COMPARISON: None    HISTORY: Chest pain. Cardiac silhouette is normal in size with mild tortuosity of the thoracic aorta  and mild aortic arch calcification. No vascular congestion. Lungs and  costophrenic angles are clear.       Impression IMPRESSION: No acute pulmonary disease     01/27/20   NUCLEAR CARDIAC STRESS TEST 01/27/2020 1/27/2020    Narrative · Baseline ECG: Sinus bradycardia, non-specific ST-T wave abnormalities. · Negative stress test.  · Moderate risk Duke treadmill score. · Gated SPECT: Left ventricular function post-stress was normal.   Calculated ejection fraction is 56%. There is no evidence of transient   ischemic dilation (TID). The TID ratio is 1.05. · Left ventricular perfusion is normal.  · Negative myocardial perfusion imaging. Myocardial perfusion imaging   supports a low risk stress test.        Signed by: Sophie Moore MD          Mr. Minda Vogt has a reminder for a \"due or due soon\" health maintenance. I have asked that he contact his primary care provider for follow-up on this health maintenance. Physical Exam   Constitutional: He is oriented to person, place, and time. He appears well-developed and well-nourished. No distress. HENT:   Head: Normocephalic and atraumatic. Mouth/Throat: Normal dentition. Eyes: Right eye exhibits no discharge. Left eye exhibits no discharge. No scleral icterus. Neck: Neck supple. No JVD present. Carotid bruit is not present. No thyromegaly present. Cardiovascular: Normal rate, regular rhythm, S1 normal, S2 normal, normal heart sounds and intact distal pulses. Exam reveals no gallop and no friction rub. No murmur heard. Pulmonary/Chest: Effort normal and breath sounds normal. He has no wheezes. He has no rales. Abdominal: Soft. He exhibits no mass. There is no tenderness. Musculoskeletal:         General: No edema. Lymphadenopathy:        Right cervical: No superficial cervical adenopathy present. Left cervical: No superficial cervical adenopathy present. Neurological: He is alert and oriented to person, place, and time. Skin: Skin is warm and dry. No rash noted. Psychiatric: He has a normal mood and affect.  His behavior is normal.       ASSESSMENT and PLAN    Chest pain is atypical and likely noncardiac. Stress test has not revealed any perfusion defects. Diaphragmatic attenuation is noted. No further cardiac testing and no restrictions on exercise. Continue statins for hyperlipidemia. Check a lipid profile. Diagnoses and all orders for this visit:    1. Other chest pain    2. Hypercholesteremia  -     LIPID PANEL; Future    3. Dizziness        Pertinent laboratory and test data reviewed and discussed with patient. See patient instructions also for other medical advice given    There are no discontinued medications. Follow-up and Dispositions    · Return in about 6 months (around 7/27/2020), or if symptoms worsen or fail to improve, for with ekg. No

## 2020-02-06 ENCOUNTER — HOSPITAL ENCOUNTER (OUTPATIENT)
Dept: LAB | Age: 80
Discharge: HOME | End: 2020-02-06
Payer: MEDICARE

## 2020-02-06 DIAGNOSIS — E78.00 HYPERCHOLESTEREMIA: ICD-10-CM

## 2020-02-06 LAB
CHOLEST SERPL-MCNC: 117 MG/DL
HDLC SERPL-MCNC: 55 MG/DL (ref 40–60)
HDLC SERPL: 2.1 {RATIO} (ref 0–5)
LDLC SERPL CALC-MCNC: 49.4 MG/DL (ref 0–100)
LIPID PROFILE,FLP: NORMAL
TRIGL SERPL-MCNC: 63 MG/DL (ref ?–150)
VLDLC SERPL CALC-MCNC: 12.6 MG/DL

## 2020-02-06 PROCEDURE — 80061 LIPID PANEL: CPT

## 2020-02-06 PROCEDURE — 36415 COLL VENOUS BLD VENIPUNCTURE: CPT

## 2020-07-30 ENCOUNTER — VIRTUAL VISIT (OUTPATIENT)
Dept: CARDIOLOGY CLINIC | Age: 80
End: 2020-07-30

## 2020-07-30 DIAGNOSIS — R07.89 OTHER CHEST PAIN: Primary | ICD-10-CM

## 2020-07-30 DIAGNOSIS — R42 DIZZINESS: ICD-10-CM

## 2020-07-30 DIAGNOSIS — E78.00 HYPERCHOLESTEREMIA: ICD-10-CM

## 2020-07-30 RX ORDER — BISACODYL 5 MG
5 TABLET, DELAYED RELEASE (ENTERIC COATED) ORAL DAILY PRN
COMMUNITY

## 2020-07-30 RX ORDER — ASCORBIC ACID 500 MG
500 TABLET ORAL DAILY
COMMUNITY

## 2020-07-30 RX ORDER — ACETAMINOPHEN 500 MG
TABLET ORAL
COMMUNITY

## 2020-07-30 NOTE — PATIENT INSTRUCTIONS
There are no discontinued medications. After the recommended changes have been made in blood pressure medicines, patient advised to keep BP/HR(pulse rate) chart twice daily and bring us results in next 1 week or so. Patient may send the results via \"My Chart\" if desired. Please rest for 5-10 minutes before checking blood pressure. Sit on a comfortable chair without crossing the legs and put your arm on a table. We recommend that you use an upper arm cuff. Check the blood pressure 3 times weekly and take the lowest reading. If you check the blood pressure in both arms, use the higher reading. A Healthy Heart: Care Instructions Your Care Instructions Coronary artery disease, also called heart disease, occurs when a substance called plaque builds up in the vessels that supply oxygen-rich blood to your heart muscle. This can narrow the blood vessels and reduce blood flow. A heart attack happens when blood flow is completely blocked. A high-fat diet, smoking, and other factors increase the risk of heart disease. Your doctor has found that you have a chance of having heart disease. You can do lots of things to keep your heart healthy. It may not be easy, but you can change your diet, exercise more, and quit smoking. These steps really work to lower your chance of heart disease. Follow-up care is a key part of your treatment and safety. Be sure to make and go to all appointments, and call your doctor if you are having problems. It's also a good idea to know your test results and keep a list of the medicines you take. How can you care for yourself at home? Diet · Use less salt when you cook and eat. This helps lower your blood pressure. Taste food before salting. Add only a little salt when you think you need it. With time, your taste buds will adjust to less salt. · Eat fewer snack items, fast foods, canned soups, and other high-salt, high-fat, processed foods. · Read food labels and try to avoid saturated and trans fats. They increase your risk of heart disease by raising cholesterol levels. · Limit the amount of solid fat-butter, margarine, and shortening-you eat. Use olive, peanut, or canola oil when you cook. Bake, broil, and steam foods instead of frying them. · Eat a variety of fruit and vegetables every day. Dark green, deep orange, red, or yellow fruits and vegetables are especially good for you. Examples include spinach, carrots, peaches, and berries. · Foods high in fiber can reduce your cholesterol and provide important vitamins and minerals. High-fiber foods include whole-grain cereals and breads, oatmeal, beans, brown rice, citrus fruits, and apples. · Eat lean proteins. Heart-healthy proteins include seafood, lean meats and poultry, eggs, beans, peas, nuts, seeds, and soy products. · Limit drinks and foods with added sugar. These include candy, desserts, and soda pop. Lifestyle changes · If your doctor recommends it, get more exercise. Walking is a good choice. Bit by bit, increase the amount you walk every day. Try for at least 30 minutes on most days of the week. You also may want to swim, bike, or do other activities. · Do not smoke. If you need help quitting, talk to your doctor about stop-smoking programs and medicines. These can increase your chances of quitting for good. Quitting smoking may be the most important step you can take to protect your heart. It is never too late to quit. · Limit alcohol to 2 drinks a day for men and 1 drink a day for women. Too much alcohol can cause health problems. · Manage other health problems such as diabetes, high blood pressure, and high cholesterol. If you think you may have a problem with alcohol or drug use, talk to your doctor. Medicines · Take your medicines exactly as prescribed. Call your doctor if you think you are having a problem with your medicine. · If your doctor recommends aspirin, take the amount directed each day. Make sure you take aspirin and not another kind of pain reliever, such as acetaminophen (Tylenol). When should you call for help? [id number] if you have symptoms of a heart attack. These may include: · Chest pain or pressure, or a strange feeling in the chest. 
· Sweating. · Shortness of breath. · Pain, pressure, or a strange feeling in the back, neck, jaw, or upper belly or in one or both shoulders or arms. · Lightheadedness or sudden weakness. · A fast or irregular heartbeat. After you call 911, the  may tell you to chew 1 adult-strength or 2 to 4 low-dose aspirin. Wait for an ambulance. Do not try to drive yourself. Watch closely for changes in your health, and be sure to contact your doctor if you have any problems. Where can you learn more? Go to http://kely-anshul.info/ Enter C456 in the search box to learn more about \"A Healthy Heart: Care Instructions. \" Current as of: December 16, 2019               Content Version: 12.5 © 3149-9212 [employer]. Care instructions adapted under license by [employer] (which disclaims liability or warranty for this information). If you have questions about a medical condition or this instruction, always ask your healthcare professional. [address] any warranty or liability for your use of this information.

## 2020-07-30 NOTE — PROGRESS NOTES
HISTORY OF PRESENT ILLNESS  Oli Viera. is a 79 y.o. male. Camryn Castro. is a 79 y.o. male who was seen by synchronous (real-time) audio-video technology on 7/30/2020. Consent:  He and/or his healthcare decision maker is aware that this patient-initiated Telehealth encounter is a billable service, with coverage as determined by his insurance carrier. He is aware that he may receive a bill and has provided verbal consent to proceed: Yes    I was in the office while conducting this encounter. Chest Pain (Angina)    The history is provided by the patient. This is a new problem. The current episode started more than 1 week ago (8/19). The problem has been resolved. The problem occurs constantly. The pain is associated with rest and exertion (Lifting weights but not walking). The pain is present in the lateral region and left side. The pain does not radiate. Pertinent negatives include no claudication, no cough, no diaphoresis, no dizziness, no fever, no headaches, no malaise/fatigue, no nausea, no orthopnea, no palpitations, no PND, no shortness of breath and no vomiting. Cholesterol Problem   The history is provided by the medical records. This is a chronic problem. Pertinent negatives include no chest pain, no headaches and no shortness of breath. Dizziness   The history is provided by the patient. This is a new problem. The current episode started more than 1 week ago (yrs). The problem has been resolved. Pertinent negatives include no chest pain, no headaches and no shortness of breath. The symptoms are aggravated by standing and bending (lying down). Review of Systems   Constitutional: Negative for chills, diaphoresis, fever, malaise/fatigue and weight loss. HENT: Negative for nosebleeds. Eyes: Negative for discharge. Respiratory: Negative for cough, shortness of breath and wheezing.     Cardiovascular: Negative for chest pain, palpitations, orthopnea, claudication, leg swelling and PND.   Gastrointestinal: Negative for diarrhea, nausea and vomiting. Genitourinary: Negative for dysuria and hematuria. Musculoskeletal: Negative for joint pain. Skin: Negative for rash. Neurological: Negative for dizziness, seizures, loss of consciousness and headaches. Endo/Heme/Allergies: Negative for polydipsia. Does not bruise/bleed easily. Psychiatric/Behavioral: Negative for depression and substance abuse. The patient does not have insomnia. No Known Allergies    Past Medical History:   Diagnosis Date    Arthritis     BPH (benign prostatic hyperplasia)     Hemorrhoids     Hemospermia     High cholesterol     Impotence of organic origin     Inguinal hernia     Insomnia     Rt groin pain     Urge incontinence        Family History   Problem Relation Age of Onset    Breast Cancer Mother     Cancer Father     Stroke Father     Heart Attack Father     Cancer Sister         hodgkins    Coronary Artery Disease Sister        Social History     Tobacco Use    Smoking status: Former Smoker     Last attempt to quit: 1985     Years since quittin.5    Smokeless tobacco: Never Used   Substance Use Topics    Alcohol use: Yes     Alcohol/week: 3.0 standard drinks     Types: 2 Glasses of wine, 1 Shots of liquor per week     Frequency: 2-3 times a week     Drinks per session: 1 or 2     Binge frequency: Never    Drug use: Never        Current Outpatient Medications   Medication Sig    OTHER,NON-FORMULARY, 1,000 mg. Black currant oil    ascorbic acid, vitamin C, (Vitamin C) 500 mg tablet Take 500 mg by mouth daily.  zinc 50 mg tab tablet Take 50 mg by mouth daily.  bisacodyL (Dulcolax, bisacodyl,) 5 mg EC tablet Take 5 mg by mouth daily as needed for Constipation.  FIBER CHOICE PO Take  by mouth.  Lactobacillus acidophilus (Probiotic) 10 billion cell cap Take  by mouth.     tamsulosin (FLOMAX) 0.4 mg capsule TAKE 1 CAPSULE BY MOUTH EVERY DAY    calcium-cholecalciferol, d3, (CALCIUM 600 + D) 600-125 mg-unit tab Take  by mouth.  sildenafiL, pulm. hypertension, (REVATIO) 20 mg tablet TAKE 1 TAB BY MOUTH AS NEEDED. TAKE 1-5 TABS AS DIRECTED INDICATIONS: [facility]    LINZESS 72 mcg cap capsule as needed.  coenzyme q10 300 mg cap Take  by mouth.  cyclobenzaprine (FLEXERIL) 5 mg tablet TAKE ONE-HALF TABLET BY MOUTH EVERY DAY AT BEDTIME AS NEEDED FOR PAIN    docusate sodium 100 mg tab Take  by mouth.  dorzolamide (TRUSOPT) 2 % ophthalmic solution INSTILL 1 DROP INTO BOTH EYES TWICE A DAY    KLOR-CON M10 10 mEq tablet TAKE 1 TABLET BY MOUTH EVERY DAY    LUMIGAN 0.01 % ophthalmic drops     COMBIGAN 0.2-0.5 % drop ophthalmic solution     AZOPT 1 % ophthalmic suspension     simvastatin (ZOCOR) 20 mg tablet     b complex vitamins (B COMPLEX 1) tablet Take 1 Tab by mouth daily.  omega-3 fatty acids-vitamin e (FISH OIL) 1,000 mg cap Take 1 Cap by mouth.  GLUCOSAM/MSM/CHOND/CQM754/HYAL (GLUCOSAMINE-CHONDROITIN-MSM PO) Take  by mouth.  Flaxseed Oil Oil by Does Not Apply route.  melatonin 3 mg tablet Take  by mouth as needed.  cycloSPORINE (RESTASIS) 0.05 % ophthalmic emulsion Administer 1 Drop to both eyes two (2) times a day.  aspirin 81 mg tablet Take 81 mg by mouth. 2 tabs daily    loteprednol etabonate (LOTEMAX) 0.5 % ophthalmic suspension Administer 1 Drop to both eyes four (4) times daily. No current facility-administered medications for this visit. Past Surgical History:   Procedure Laterality Date    HX CATARACT REMOVAL      HX HERNIA REPAIR      HX KNEE ARTHROSCOPY      HX TURP         There were no vitals taken for this visit. Diagnostic Studies:  I have reviewed the relevant tests done on the patient and show as follows  EKG tracings reviewed by me today.     EKG Results     None        XR Results (most recent):  Results from Hospital Encounter encounter on 01/21/20   XR CHEST PORT    Narrative Portable CXR:    COMPARISON: None    HISTORY: Chest pain. Cardiac silhouette is normal in size with mild tortuosity of the thoracic aorta  and mild aortic arch calcification. No vascular congestion. Lungs and  costophrenic angles are clear. Impression IMPRESSION: No acute pulmonary disease     01/27/20   NUCLEAR CARDIAC STRESS TEST 01/27/2020 1/27/2020    Narrative · Baseline ECG: Sinus bradycardia, non-specific ST-T wave abnormalities. · Negative stress test.  · Moderate risk Duke treadmill score. · Gated SPECT: Left ventricular function post-stress was normal.   Calculated ejection fraction is 56%. There is no evidence of transient   ischemic dilation (TID). The TID ratio is 1.05. · Left ventricular perfusion is normal.  · Negative myocardial perfusion imaging. Myocardial perfusion imaging   supports a low risk stress test.        Signed by: Ramya Mike MD          Mr. Leopold Pita has a reminder for a \"due or due soon\" health maintenance. I have asked that he contact his primary care provider for follow-up on this health maintenance. Physical Exam   Constitutional: He is oriented to person, place, and time. He appears well-developed and well-nourished. No distress. HENT:   Head: Normocephalic and atraumatic. Nose: Nose normal.   Eyes: Conjunctivae and EOM are normal. Right eye exhibits no discharge. Left eye exhibits no discharge. No scleral icterus. Neck: Normal range of motion. No JVD present. No thyromegaly present. Pulmonary/Chest: Effort normal. No accessory muscle usage or stridor. Abdominal: Soft. He exhibits no distension. Musculoskeletal: Normal range of motion. General: No edema. Neurological: He is alert and oriented to person, place, and time. Gait normal.   No obvious facial asymmetry   Skin: No rash noted. No erythema. Psychiatric: He has a normal mood and affect. His behavior is normal.       ASSESSMENT and PLAN    Chest pain has resolved.   No further cardiac work-up is needed. Dizziness has resolved, was due to an ENT issue for which she had some procedure done. Blood pressure is mildly elevated and patient says that he will follow and check with PCP. He has not required any medication in the last few years as it used to be very low. Diagnoses and all orders for this visit:    1. Other chest pain  Comments:  7/20 resolved    2. Hypercholesteremia    3. Dizziness  Comments:  7/20 resolved, was due to ENT etiology; Pertinent laboratory and test data reviewed and discussed with patient. See patient instructions also for other medical advice given    There are no discontinued medications. Follow-up and Dispositions    · Return if symptoms worsen or fail to improve. Vital Signs: (As obtained by patient/caregiver at home)  There were no vitals taken for this visit. Other pertinent observable physical exam findings:-      We discussed the expected course, resolution and complications of the diagnosis(es) in detail. Medication risks, benefits, costs, interactions, and alternatives were discussed as indicated. I advised him to contact the office if his condition worsens, changes or fails to improve as anticipated. He expressed understanding with the diagnosis(es) and plan. Pursuant to the emergency declaration under the [facility], 1135 waiver authority and the [employer] and [employer] Act, this Virtual  Visit was conducted, with patient's consent, to reduce the patient's risk of exposure to COVID-19 and provide continuity of care for an established patient. Services were provided through a video synchronous discussion virtually to substitute for in-person clinic visit.     Esperanza Manning MD

## 2020-07-30 NOTE — PROGRESS NOTES
Vital Signs:   BP: 154/68  PULSE: 61  WEIGHT:208lb  HEIGHT: 6ft      1. Have you been to the ER, urgent care clinic since your last visit? Hospitalized since your last visit?     no    2. Have you seen or consulted any other health care providers outside of the [address] [place] [address] since your last visit? Include any pap smears or colon screening. Yes When: x 1 yr with PCP      3. Since your last visit, have you had any of the following symptoms?      no    4. Have you had any blood work, X-rays or cardiac testing? Yes     Requested: YES     In [facility]: YES    5. Where do you normally have your labs drawn? [facility]    6. Do you need any refills today?    no

## 2020-09-30 NOTE — PROGRESS NOTES
[facility] [address]  [address] [facility]  [phone] fax    Physical Therapy Discharge Summary    Patient name: Aidan Joyce. Start of Care: 2019   Referral source: Asha Kenny MD : [id number]               Medical Diagnosis: Sciatica, right side [M54.31]  Payor: [place] MEDICARE / Plan: [place] MEDICARE PART A & B / Product Type: Medicare /  Onset Date:2019               Treatment Diagnosis: LBP, Right LE Pain    Prior Hospitalization: see medical history Provider#: [id number]   Medications: Verified on Patient summary List    Comorbidities: arthritis, urge incontinence    Prior Level of Function: lives with wife in a 1 story home with 4 steps to enter wit handrail, Ind with ambulation, Ind with ADLs and household management, plays bridge     Visits from Start of Care: 4    Missed Visits: 2    Reporting Period : 19 to 19    Summary of Care:  Goal: Pt will improve FOTO by 5 points in order to demonstrate functional improvement. Status at last note/certification: 66  Status at discharge: not met    Goal: Pt will report 50% improvement in sx in order to improve QOL. Status at last note/certification: n/a  Status at discharge: not met    Goal: Pt will improve sitting tolerance to 1 hour with minimal pain increase in order to improve driving tolerance and ease of visiting family. Status at last note/certification: unable   Status at discharge: not met    Pt. Was seen for 4 visits and then did not return to PT. Goals were unable to be re-assessed secondary to unplanned D/C.      ASSESSMENT/RECOMMENDATIONS:  [x]Discontinue therapy: []Patient has reached or is progressing toward set goals      [x]Patient is non-compliant or has abdicated      []Due to lack of appreciable progress towards set goals    Conor Gaines, PT 2020 2:51 PM

## 2020-11-05 ENCOUNTER — OFFICE VISIT (OUTPATIENT)
Dept: CARDIOLOGY CLINIC | Age: 80
End: 2020-11-05
Payer: MEDICARE

## 2020-11-05 VITALS
SYSTOLIC BLOOD PRESSURE: 102 MMHG | HEIGHT: 72 IN | TEMPERATURE: 97.9 F | DIASTOLIC BLOOD PRESSURE: 49 MMHG | HEART RATE: 48 BPM | BODY MASS INDEX: 28.01 KG/M2 | WEIGHT: 206.8 LBS

## 2020-11-05 DIAGNOSIS — I20.8 ATYPICAL ANGINA (HCC): ICD-10-CM

## 2020-11-05 DIAGNOSIS — R07.89 OTHER CHEST PAIN: Primary | ICD-10-CM

## 2020-11-05 DIAGNOSIS — E78.00 HYPERCHOLESTEREMIA: ICD-10-CM

## 2020-11-05 DIAGNOSIS — R06.02 SOB (SHORTNESS OF BREATH) ON EXERTION: ICD-10-CM

## 2020-11-05 DIAGNOSIS — R42 DIZZINESS: ICD-10-CM

## 2020-11-05 PROCEDURE — 1101F PT FALLS ASSESS-DOCD LE1/YR: CPT | Performed by: INTERNAL MEDICINE

## 2020-11-05 PROCEDURE — G8432 DEP SCR NOT DOC, RNG: HCPCS | Performed by: INTERNAL MEDICINE

## 2020-11-05 PROCEDURE — G8419 CALC BMI OUT NRM PARAM NOF/U: HCPCS | Performed by: INTERNAL MEDICINE

## 2020-11-05 PROCEDURE — 99214 OFFICE O/P EST MOD 30 MIN: CPT | Performed by: INTERNAL MEDICINE

## 2020-11-05 PROCEDURE — G8536 NO DOC ELDER MAL SCRN: HCPCS | Performed by: INTERNAL MEDICINE

## 2020-11-05 PROCEDURE — 93000 ELECTROCARDIOGRAM COMPLETE: CPT | Performed by: INTERNAL MEDICINE

## 2020-11-05 PROCEDURE — G8427 DOCREV CUR MEDS BY ELIG CLIN: HCPCS | Performed by: INTERNAL MEDICINE

## 2020-11-05 RX ORDER — TRAZODONE HYDROCHLORIDE 50 MG/1
50 TABLET ORAL
COMMUNITY

## 2020-11-05 NOTE — PROGRESS NOTES
HISTORY OF PRESENT ILLNESS  Annalisa Sanford is a 79 y.o. male. Chest Pain (Angina)    The history is provided by the patient. This is a new problem. The current episode started more than 1 week ago (8/19). The problem has been gradually worsening. The problem occurs constantly. The pain is associated with rest and exertion (Exercises worse on the underlying chronic constant discomfort). The pain is present in the lateral region and left side. The pain does not radiate. Associated symptoms include dizziness and palpitations. Pertinent negatives include no claudication, no cough, no diaphoresis, no fever, no headaches, no malaise/fatigue, no nausea, no orthopnea, no PND, no shortness of breath and no vomiting. Cholesterol Problem   The history is provided by the medical records. This is a chronic problem. Associated symptoms include chest pain. Pertinent negatives include no headaches and no shortness of breath. Dizziness   The history is provided by the patient. This is a new problem. The current episode started more than 1 week ago (yrs). The problem has been resolved. Associated symptoms include chest pain. Pertinent negatives include no headaches and no shortness of breath. The symptoms are aggravated by standing and bending (lying down). Palpitations    The history is provided by the patient. This is a new problem. The current episode started more than 1 week ago. On average, each episode lasts 30 seconds. The problem is associated with exercise (Also at rest sometimes). Associated symptoms include chest pain and dizziness. Pertinent negatives include no diaphoresis, no fever, no malaise/fatigue, no claudication, no orthopnea, no PND, no nausea, no vomiting, no headaches, no cough and no shortness of breath. Shortness of Breath   The history is provided by the patient. This is a new problem. The problem occurs intermittently. The current episode started more than 1 week ago (7/20).  Associated symptoms include chest pain. Pertinent negatives include no fever, no headaches, no cough, no wheezing, no PND, no orthopnea, no vomiting, no rash, no leg swelling and no claudication. The problem's precipitants include exercise. Review of Systems   Constitutional: Negative for chills, diaphoresis, fever, malaise/fatigue and weight loss. HENT: Negative for nosebleeds. Eyes: Negative for discharge. Respiratory: Negative for cough, shortness of breath and wheezing. Cardiovascular: Positive for chest pain and palpitations. Negative for orthopnea, claudication, leg swelling and PND. Gastrointestinal: Negative for diarrhea, nausea and vomiting. Genitourinary: Negative for dysuria and hematuria. Musculoskeletal: Negative for joint pain. Skin: Negative for rash. Neurological: Positive for dizziness. Negative for seizures, loss of consciousness and headaches. Endo/Heme/Allergies: Negative for polydipsia. Does not bruise/bleed easily. Psychiatric/Behavioral: Negative for depression and substance abuse. The patient does not have insomnia. No Known Allergies    Past Medical History:   Diagnosis Date    Arthritis     BPH (benign prostatic hyperplasia)     Hemorrhoids     Hemospermia     High cholesterol     Impotence of organic origin     Inguinal hernia     Insomnia     Rt groin pain     Urge incontinence        Family History   Problem Relation Age of Onset    Breast Cancer Mother     Cancer Father     Stroke Father     Heart Attack Father     Cancer Sister         hodgkins    Coronary Artery Disease Sister        Social History     Tobacco Use    Smoking status: Former Smoker     Last attempt to quit: 1985     Years since quittin.8    Smokeless tobacco: Never Used   Substance Use Topics    Alcohol use:  Yes     Alcohol/week: 3.0 standard drinks     Types: 2 Glasses of wine, 1 Shots of liquor per week     Frequency: 2-3 times a week     Drinks per session: 1 or 2     Binge frequency: Never    Drug use: Never        Current Outpatient Medications   Medication Sig    traZODone (DESYREL) 50 mg tablet Take 50 mg by mouth nightly. PRN    sildenafiL, pulmonary hypertension, (REVATIO) 20 mg tablet TAKE 1 TABLET BY MOUTH AS NEEDED. TAKE 1 TO 5 TABLETS AS DIRECTED. INDICATIONS: [facility]. (Patient taking differently: Take 20 mg by mouth daily. TAKE 1 TABLET BY MOUTH.)    OTHER,NON-FORMULARY, 1,000 mg. Black currant oil    ascorbic acid, vitamin C, (Vitamin C) 500 mg tablet Take 500 mg by mouth daily.  bisacodyL (Dulcolax, bisacodyl,) 5 mg EC tablet Take 5 mg by mouth daily as needed for Constipation.  Lactobacillus acidophilus (Probiotic) 10 billion cell cap Take  by mouth.  tamsulosin (FLOMAX) 0.4 mg capsule TAKE 1 CAPSULE BY MOUTH EVERY DAY    calcium-cholecalciferol, d3, (CALCIUM 600 + D) 600-125 mg-unit tab Take  by mouth.  coenzyme q10 300 mg cap Take  by mouth.  docusate sodium 100 mg tab Take  by mouth.  dorzolamide (TRUSOPT) 2 % ophthalmic solution INSTILL 1 DROP INTO BOTH EYES TWICE A DAY    LUMIGAN 0.01 % ophthalmic drops     COMBIGAN 0.2-0.5 % drop ophthalmic solution     simvastatin (ZOCOR) 20 mg tablet Take 20 mg by mouth nightly.  b complex vitamins (B COMPLEX 1) tablet Take 1 Tab by mouth daily.  omega-3 fatty acids-vitamin e (FISH OIL) 1,000 mg cap Take 1 Cap by mouth.  GLUCOSAM/MSM/CHOND/HWX512/HYAL (GLUCOSAMINE-CHONDROITIN-MSM PO) Take  by mouth.  Flaxseed Oil Oil by Does Not Apply route.  melatonin 3 mg tablet Take  by mouth as needed.  cycloSPORINE (RESTASIS) 0.05 % ophthalmic emulsion Administer 1 Drop to both eyes two (2) times a day.  aspirin 81 mg tablet Take 81 mg by mouth. 2 tabs daily    loteprednol etabonate (LOTEMAX) 0.5 % ophthalmic suspension Administer 1 Drop to both eyes four (4) times daily. No current facility-administered medications for this visit.          Past Surgical History:   Procedure Laterality Date    HX CATARACT REMOVAL      HX HERNIA REPAIR      HX KNEE ARTHROSCOPY      HX TURP         Visit Vitals  BP (!) 102/49 (BP 1 Location: Left arm, BP Patient Position: Sitting)   Pulse (!) 48   Temp 97.9 °F (36.6 °C) (Temporal)   Ht 6' (1.829 m)   Wt 93.8 kg (206 lb 12.8 oz)   BMI 28.05 kg/m²       Diagnostic Studies:  I have reviewed the relevant tests done on the patient and show as follows  EKG tracings reviewed by me today. EKG Results     Procedure 720 Value Units Date/Time    AMB POC EKG ROUTINE W/ 12 LEADS, INTER & REP [[id number]] Resulted:  11/05/20 3850    Order Status:  Completed Updated:  11/05/20 0813        XR Results (most recent):  Results from Hospital Encounter encounter on 01/21/20   XR CHEST PORT    Narrative Portable CXR:    COMPARISON: None    HISTORY: Chest pain. Cardiac silhouette is normal in size with mild tortuosity of the thoracic aorta  and mild aortic arch calcification. No vascular congestion. Lungs and  costophrenic angles are clear. Impression IMPRESSION: No acute pulmonary disease     01/27/20   NUCLEAR CARDIAC STRESS TEST 01/27/2020 1/27/2020    Narrative · Baseline ECG: Sinus bradycardia, non-specific ST-T wave abnormalities. · Negative stress test.  · Moderate risk Duke treadmill score. · Gated SPECT: Left ventricular function post-stress was normal.   Calculated ejection fraction is 56%. There is no evidence of transient   ischemic dilation (TID). The TID ratio is 1.05. · Left ventricular perfusion is normal.  · Negative myocardial perfusion imaging. Myocardial perfusion imaging   supports a low risk stress test.        Signed by: Ailyn Clemons MD          Mr. Arlyn Pichardo has a reminder for a \"due or due soon\" health maintenance. I have asked that he contact his primary care provider for follow-up on this health maintenance. Physical Exam   Constitutional: He is oriented to person, place, and time. He appears well-developed and well-nourished.  No distress. HENT:   Head: Normocephalic and atraumatic. Mouth/Throat: Normal dentition. Eyes: Right eye exhibits no discharge. Left eye exhibits no discharge. No scleral icterus. Neck: Neck supple. No JVD present. Carotid bruit is not present. No thyromegaly present. Cardiovascular: Normal rate, regular rhythm, S1 normal, S2 normal, normal heart sounds and intact distal pulses. Exam reveals no gallop and no friction rub. No murmur heard. Pulmonary/Chest: Effort normal and breath sounds normal. He has no wheezes. He has no rales. Abdominal: Soft. He exhibits no mass. There is no abdominal tenderness. Musculoskeletal:         General: No edema. Lymphadenopathy:        Right cervical: No superficial cervical adenopathy present. Left cervical: No superficial cervical adenopathy present. Neurological: He is alert and oriented to person, place, and time. Skin: Skin is warm and dry. No rash noted. Psychiatric: He has a normal mood and affect. His behavior is normal.       ASSESSMENT and PLAN    HLD : Results for Virginie Person (MRN [id number]) as of 11/5/2020 08:45   Ref. Range 2/6/2020 09:19   Triglyceride Latest Ref Range: <150 MG/DL 63   Cholesterol, total Latest Ref Range: <200 MG/   HDL Cholesterol Latest Ref Range: 40 - 60 MG/DL 55   CHOL/HDL Ratio Latest Ref Range: 0 - 5.0   2.1   VLDL, calculated Latest Units: MG/DL 12.6   LDL, calculated Latest Ref Range: 0 - 100 MG/DL 49.4       Chest pain is atypical.  Stress test has not revealed any perfusion defects. Diaphragmatic attenuation is noted. Discussed with patient the cardiac catheterization but he would prefer the echo first.  Will order it as he also has dyspnea on exertion. Lipids are excellent now. Continue statins for hyperlipidemia. Diagnoses and all orders for this visit:    1. Other chest pain  -     AMB POC EKG ROUTINE W/ 12 LEADS, INTER & REP    2. Hypercholesteremia    3. Dizziness    4.  Atypical angina ([facility] [address]    5. SOB (shortness of breath) on exertion  -     ECHO ADULT COMPLETE; Future        Pertinent laboratory and test data reviewed and discussed with patient.   See patient instructions also for other medical advice given    Medications Discontinued During This Encounter   Medication Reason    AZOPT 1 % ophthalmic suspension     cyclobenzaprine (FLEXERIL) 5 mg tablet     FIBER CHOICE PO     KLOR-CON M10 10 mEq tablet     LINZESS 72 mcg cap capsule     zinc 50 mg tab tablet        Follow-up and Dispositions    · Return in about 3 months (around 2/5/2021), or if symptoms worsen or fail to improve, for post test.

## 2020-11-05 NOTE — PATIENT INSTRUCTIONS
Medications Discontinued During This Encounter Medication Reason  AZOPT 1 % ophthalmic suspension  cyclobenzaprine (FLEXERIL) 5 mg tablet  FIBER CHOICE PO   
 KLOR-CON M10 10 mEq tablet  LINZESS 72 mcg cap capsule  zinc 50 mg tab tablet Chest Pain: Care Instructions Your Care Instructions There are many things that can cause chest pain. Some are not serious and will get better on their own in a few days. But some kinds of chest pain need more testing and treatment. Your doctor may have recommended a follow-up visit in the next 8 to 12 hours. If you are not getting better, you may need more tests or treatment. Even though your doctor has released you, you still need to watch for any problems. The doctor carefully checked you, but sometimes problems can develop later. If you have new symptoms or if your symptoms do not get better, get medical care right away. If you have worse or different chest pain or pressure that lasts more than 5 minutes or you passed out (lost consciousness), call 911 or seek other emergency help right away. A medical visit is only one step in your treatment. Even if you feel better, you still need to do what your doctor recommends, such as going to all suggested follow-up appointments and taking medicines exactly as directed. This will help you recover and help prevent future problems. How can you care for yourself at home? · Rest until you feel better. · Take your medicine exactly as prescribed. Call your doctor if you think you are having a problem with your medicine. · Do not drive after taking a prescription pain medicine. When should you call for help? Call 911 if:  
  · You passed out (lost consciousness).  
  · You have severe difficulty breathing.  
  · You have symptoms of a heart attack. These may include: 
? Chest pain or pressure, or a strange feeling in your chest. 
? Sweating. ? Shortness of breath. ? Nausea or vomiting. ? Pain, pressure, or a strange feeling in your back, neck, jaw, or upper belly or in one or both shoulders or arms. ? Lightheadedness or sudden weakness. ? A fast or irregular heartbeat. After you call 911, the  may tell you to chew 1 adult-strength or 2 to 4 low-dose aspirin. Wait for an ambulance. Do not try to drive yourself. Call your doctor today if:  
  · You have any trouble breathing.  
  · Your chest pain gets worse.  
  · You are dizzy or lightheaded, or you feel like you may faint.  
  · You are not getting better as expected.  
  · You are having new or different chest pain. Where can you learn more? Go to http://www.garcia.com/ Enter A120 in the search box to learn more about \"Chest Pain: Care Instructions. \" Current as of: June 26, 2019               Content Version: 12.6 © 1493-9967 [employer]. Care instructions adapted under license by [employer] (which disclaims liability or warranty for this information). If you have questions about a medical condition or this instruction, always ask your healthcare professional. [place] [address] any warranty or liability for your use of this information.

## 2020-11-05 NOTE — PROGRESS NOTES
1. Have you been to the ER, urgent care clinic since your last visit? Hospitalized since your last visit?     no    2. Have you seen or consulted any other health care providers outside of the [address] [place] [address] since your last visit? Include any pap smears or colon screening. Yes When: x a couple of months ago with PCP      3. Since your last visit, have you had any of the following symptoms? chest pains, shortness of breath and dizziness. Explain:CP x 1 wk ago     4. Have you had any blood work, X-rays or cardiac testing? No     Requested: NO  5. Where do you normally have your labs drawn? [facility]    6. Do you need any refills today?    no

## 2020-11-18 ENCOUNTER — HOSPITAL ENCOUNTER (OUTPATIENT)
Dept: ULTRASOUND IMAGING | Age: 80
Discharge: HOME | End: 2020-11-18
Attending: NURSE PRACTITIONER
Payer: MEDICARE

## 2020-11-18 DIAGNOSIS — R10.2 PERINEAL PAIN: ICD-10-CM

## 2020-11-18 PROCEDURE — 76870 US EXAM SCROTUM: CPT

## 2020-11-19 ENCOUNTER — OFFICE VISIT (OUTPATIENT)
Dept: CARDIOLOGY CLINIC | Age: 80
End: 2020-11-19
Payer: MEDICARE

## 2020-11-19 VITALS
OXYGEN SATURATION: 100 % | SYSTOLIC BLOOD PRESSURE: 112 MMHG | HEART RATE: 49 BPM | TEMPERATURE: 97.7 F | BODY MASS INDEX: 28.44 KG/M2 | HEIGHT: 72 IN | DIASTOLIC BLOOD PRESSURE: 51 MMHG | WEIGHT: 210 LBS

## 2020-11-19 DIAGNOSIS — I20.8 ATYPICAL ANGINA (HCC): Primary | ICD-10-CM

## 2020-11-19 DIAGNOSIS — E78.00 HYPERCHOLESTEREMIA: ICD-10-CM

## 2020-11-19 DIAGNOSIS — N52.9 ERECTILE DYSFUNCTION, UNSPECIFIED ERECTILE DYSFUNCTION TYPE: ICD-10-CM

## 2020-11-19 DIAGNOSIS — R93.1 ABNORMAL NUCLEAR CARDIAC IMAGING TEST: ICD-10-CM

## 2020-11-19 PROCEDURE — G8419 CALC BMI OUT NRM PARAM NOF/U: HCPCS | Performed by: INTERNAL MEDICINE

## 2020-11-19 PROCEDURE — G8427 DOCREV CUR MEDS BY ELIG CLIN: HCPCS | Performed by: INTERNAL MEDICINE

## 2020-11-19 PROCEDURE — G8536 NO DOC ELDER MAL SCRN: HCPCS | Performed by: INTERNAL MEDICINE

## 2020-11-19 PROCEDURE — 99214 OFFICE O/P EST MOD 30 MIN: CPT | Performed by: INTERNAL MEDICINE

## 2020-11-19 PROCEDURE — 1101F PT FALLS ASSESS-DOCD LE1/YR: CPT | Performed by: INTERNAL MEDICINE

## 2020-11-19 PROCEDURE — G8432 DEP SCR NOT DOC, RNG: HCPCS | Performed by: INTERNAL MEDICINE

## 2020-11-19 RX ORDER — SILDENAFIL CITRATE 20 MG/1
20 TABLET ORAL
Qty: 30 TAB | Refills: 0
Start: 2020-11-19 | End: 2022-01-10

## 2020-11-19 RX ORDER — NITROGLYCERIN 0.4 MG/1
0.4 TABLET SUBLINGUAL
Status: CANCELLED | OUTPATIENT
Start: 2020-11-19

## 2020-11-19 RX ORDER — SODIUM CHLORIDE 0.9 % (FLUSH) 0.9 %
5-40 SYRINGE (ML) INJECTION AS NEEDED
Status: CANCELLED | OUTPATIENT
Start: 2020-11-19

## 2020-11-19 RX ORDER — SODIUM CHLORIDE 0.9 % (FLUSH) 0.9 %
5-40 SYRINGE (ML) INJECTION EVERY 8 HOURS
Status: CANCELLED | OUTPATIENT
Start: 2020-11-19

## 2020-11-19 RX ORDER — ASPIRIN 325 MG
325 TABLET ORAL DAILY
Status: CANCELLED | OUTPATIENT
Start: 2020-11-19

## 2020-11-19 RX ORDER — SODIUM CHLORIDE 9 MG/ML
100 INJECTION, SOLUTION INTRAVENOUS CONTINUOUS
Status: CANCELLED | OUTPATIENT
Start: 2020-11-19 | End: 2020-11-19

## 2020-11-19 NOTE — PROGRESS NOTES
1. Have you been to the ER, urgent care clinic since your last visit? Hospitalized since your last visit? No    2. Have you seen or consulted any other health care providers outside of the [address] [place] [address] since your last visit? Include any pap smears or colon screening. Yes Where: PCP     3. Since your last visit, have you had any of the following symptoms? chest pains and shortness of breath. 4.  Have you had any blood work, X-rays or cardiac testing? Yes: [facility]   Requested: NO     In [facility]: NO    5. Where do you normally have your labs drawn? [facility]    6. Do you need any refills today?    No

## 2020-11-19 NOTE — H&P (VIEW-ONLY)
HISTORY OF PRESENT ILLNESS Carlos Larose is a 79 y.o. male. Cholesterol Problem The history is provided by the medical records. This is a chronic problem. Associated symptoms include chest pain and shortness of breath. Pertinent negatives include no headaches. Chest Pain (Angina) The history is provided by the patient. This is a new problem. The current episode started more than 1 week ago (8/19). The problem has been gradually worsening. The problem occurs constantly. The pain is associated with rest and exertion (Exercises worse on the underlying chronic constant discomfort). The pain is present in the lateral region and left side. The pain does not radiate. Associated symptoms include dizziness, palpitations and shortness of breath. Pertinent negatives include no claudication, no cough, no diaphoresis, no fever, no headaches, no malaise/fatigue, no nausea, no orthopnea, no PND and no vomiting. Palpitations The history is provided by the patient. This is a new problem. The current episode started more than 1 week ago. On average, each episode lasts 30 seconds. The problem is associated with exercise (Also at rest sometimes). Associated symptoms include chest pain, dizziness and shortness of breath. Pertinent negatives include no diaphoresis, no fever, no malaise/fatigue, no claudication, no orthopnea, no PND, no nausea, no vomiting, no headaches and no cough. Shortness of Breath The history is provided by the patient. This is a new problem. The problem occurs intermittently. The current episode started more than 1 week ago (7/20). Associated symptoms include chest pain. Pertinent negatives include no fever, no headaches, no cough, no wheezing, no PND, no orthopnea, no vomiting, no rash, no leg swelling and no claudication. The problem's precipitants include exercise. Review of Systems Constitutional: Negative for chills, diaphoresis, fever, malaise/fatigue and weight loss. HENT: Negative for nosebleeds. Eyes: Negative for discharge. Respiratory: Positive for shortness of breath. Negative for cough and wheezing. Cardiovascular: Positive for chest pain and palpitations. Negative for orthopnea, claudication, leg swelling and PND. Gastrointestinal: Negative for diarrhea, nausea and vomiting. Genitourinary: Negative for dysuria and hematuria. Musculoskeletal: Negative for joint pain. Skin: Negative for rash. Neurological: Positive for dizziness. Negative for seizures, loss of consciousness and headaches. Endo/Heme/Allergies: Negative for polydipsia. Does not bruise/bleed easily. Psychiatric/Behavioral: Negative for depression and substance abuse. The patient does not have insomnia. No Known Allergies Past Medical History:  
Diagnosis Date  Arthritis  BPH (benign prostatic hyperplasia)  Hemorrhoids  Hemospermia  High cholesterol  Impotence of organic origin  Inguinal hernia  Insomnia  Rt groin pain  Urge incontinence Family History Problem Relation Age of Onset  Breast Cancer Mother  Cancer Father  Stroke Father  Heart Attack Father  Cancer Sister   
     hodgkins  Coronary Artery Disease Sister Social History Tobacco Use  Smoking status: Former Smoker Last attempt to quit: 1985 Years since quittin.8  Smokeless tobacco: Never Used Substance Use Topics  Alcohol use: Yes Alcohol/week: 3.0 standard drinks Types: 2 Glasses of wine, 1 Shots of liquor per week Frequency: 2-3 times a week Drinks per session: 1 or 2 Binge frequency: Never  Drug use: Never Current Outpatient Medications Medication Sig  
 amoxicillin-clavulanate (AUGMENTIN) 500-125 mg per tablet Take 1 Tab by mouth two (2) times a day for 7 days.  traZODone (DESYREL) 50 mg tablet Take 50 mg by mouth nightly.  PRN  
  sildenafiL, pulmonary hypertension, (REVATIO) 20 mg tablet TAKE 1 TABLET BY MOUTH AS NEEDED. TAKE 1 TO 5 TABLETS AS DIRECTED. INDICATIONS: [facility]. (Patient taking differently: Take 20 mg by mouth daily. TAKE 1 TABLET BY MOUTH.)  OTHER,NON-FORMULARY, 1,000 mg. Black currant oil  ascorbic acid, vitamin C, (Vitamin C) 500 mg tablet Take 500 mg by mouth daily.  bisacodyL (Dulcolax, bisacodyl,) 5 mg EC tablet Take 5 mg by mouth daily as needed for Constipation.  Lactobacillus acidophilus (Probiotic) 10 billion cell cap Take  by mouth.  tamsulosin (FLOMAX) 0.4 mg capsule TAKE 1 CAPSULE BY MOUTH EVERY DAY  calcium-cholecalciferol, d3, (CALCIUM 600 + D) 600-125 mg-unit tab Take  by mouth.  coenzyme q10 300 mg cap Take  by mouth.  docusate sodium 100 mg tab Take  by mouth.  dorzolamide (TRUSOPT) 2 % ophthalmic solution INSTILL 1 DROP INTO BOTH EYES TWICE A DAY  LUMIGAN 0.01 % ophthalmic drops  COMBIGAN 0.2-0.5 % drop ophthalmic solution  simvastatin (ZOCOR) 20 mg tablet Take 20 mg by mouth nightly.  b complex vitamins (B COMPLEX 1) tablet Take 1 Tab by mouth daily.  omega-3 fatty acids-vitamin e (FISH OIL) 1,000 mg cap Take 1 Cap by mouth.  GLUCOSAM/MSM/CHOND/QDT327/HYAL (GLUCOSAMINE-CHONDROITIN-MSM PO) Take  by mouth.  Flaxseed Oil Oil by Does Not Apply route.  melatonin 3 mg tablet Take 10 mg by mouth as needed.  cycloSPORINE (RESTASIS) 0.05 % ophthalmic emulsion Administer 1 Drop to both eyes two (2) times a day.  aspirin 81 mg tablet Take 81 mg by mouth. 2 tabs daily  loteprednol etabonate (LOTEMAX) 0.5 % ophthalmic suspension Administer 1 Drop to both eyes four (4) times daily. No current facility-administered medications for this visit. Past Surgical History:  
Procedure Laterality Date  HX CATARACT REMOVAL    
 HX HERNIA REPAIR    
 HX KNEE ARTHROSCOPY    
 HX TURP Visit Vitals BP (!) 112/51 (BP 1 Location: Left arm, BP Patient Position: Sitting) Pulse (!) 49 Temp 97.7 °F (36.5 °C) (Temporal) Ht 6' (1.829 m) Wt 95.3 kg (210 lb) SpO2 100% BMI 28.48 kg/m² Diagnostic Studies: 
I have reviewed the relevant tests done on the patient and show as follows EKG tracings reviewed by me today. EKG Results None XR Results (most recent): 
Results from JEANNETTE MORAN [facility] Encounter encounter on 01/21/20 XR CHEST PORT Narrative Portable CXR: 
 
COMPARISON: None HISTORY: Chest pain. Cardiac silhouette is normal in size with mild tortuosity of the thoracic aorta 
and mild aortic arch calcification. No vascular congestion. Lungs and 
costophrenic angles are clear. Impression IMPRESSION: No acute pulmonary disease 01/27/20 NUCLEAR CARDIAC STRESS TEST 01/27/2020 1/27/2020 Narrative · Baseline ECG: Sinus bradycardia, non-specific ST-T wave abnormalities. · Negative stress test. 
· Moderate risk Duke treadmill score. · Gated SPECT: Left ventricular function post-stress was normal.  
Calculated ejection fraction is 56%. There is no evidence of transient  
ischemic dilation (TID). The TID ratio is 1.05. · Left ventricular perfusion is normal. 
· Negative myocardial perfusion imaging. Myocardial perfusion imaging  
supports a low risk stress test. 
   
  Signed by: Kerry Saravia MD  
 
  
 
 [facility] \"[facility] or [facility]\" [facility]. I have asked that he contact his primary care provider for follow-up on this health maintenance. Physical Exam  
Constitutional: He is oriented to person, place, and time. He appears well-developed and well-nourished. No distress. HENT:  
Head: Normocephalic and atraumatic. Mouth/Throat: Normal dentition. Eyes: Right eye exhibits no discharge. Left eye exhibits no discharge. No scleral icterus. Neck: Neck supple. No JVD present. Carotid bruit is not present. No thyromegaly present. Cardiovascular: Normal rate, regular rhythm, S1 normal, S2 normal, normal heart sounds and intact distal pulses. Exam reveals no gallop and no friction rub. No murmur heard. Pulmonary/Chest: Effort normal and breath sounds normal. He has no wheezes. He has no rales. Abdominal: Soft. He exhibits no mass. There is no abdominal tenderness. Musculoskeletal:     
   General: No edema. Lymphadenopathy:  
     Right cervical: No superficial cervical adenopathy present. Left cervical: No superficial cervical adenopathy present. Neurological: He is alert and oriented to person, place, and time. Skin: Skin is warm and dry. No rash noted. Psychiatric: He has a normal mood and affect. His behavior is normal.  
 
 
ASSESSMENT and PLAN 
 
HLD : Results for Audelia Haskins (MRN [id number]) as of 11/5/2020 08:45 Ref. Range 2/6/2020 09:19 Triglyceride Latest Ref Range: <150 MG/DL 63 Cholesterol, total Latest Ref Range: <200 MG/ HDL Cholesterol Latest Ref Range: 40 - 60 MG/DL 55  
CHOL/HDL Ratio Latest Ref Range: 0 - 5.0   2.1 VLDL, calculated Latest Units: MG/DL 12.6 LDL, calculated Latest Ref Range: 0 - 100 MG/DL 49.4 Chest pain is atypical.  Stress test has not revealed any definite perfusion defects. Possible diaphragmatic attenuation is noted. Continues to have chest discomfort which worsens consistently with exertion suggesting possible angina. Patient wants heart catheterization and I agree with it. Lipids are excellent now. Continue statins for hyperlipidemia. The benefits and risks of cardiac catheterization have been discussed in detail with the patient present at the time.  Patient understands  risk of potential cath complications including but not limited to bleeding, infection, difficulty healing the arteriotomy access site(2 chances in 100) which may require surgical repair, potential thromboembolic complications which could result in stroke, myocardial infarction, vascular injury, loss of limb or organ function and/or death( 1 chance in 1000)and potential allergic reaction to contrast dye or other medication used during the procedure. Patient is also aware of the therapeutic implications for medical management vs coronary artery bypass surgery vs percutaneous coronary intervention in treatment of coronary artery disease. The additional risks for percutaneous coronary artery intervention include the need for emergent bypass surgery at 1 chance in 100 and for restenosis which may range from 35% for plain balloon angioplasty, 15% for bare metal stent, and 5% for drug eluting stent implants. The need for mandatory uninterrupted dual antiplatelet therapy with lifelong Aspirin combined with Plavix for up to 12 months following drug eluting stents, and minimum 1 month following bare metal stents to prevent stent thrombosis which is the equivalent of acute heart attack has been reviewed in detail. No contraindications to use of drug eluting stents has been discovered. Diagnoses and all orders for this visit: 1. Atypical angina (HCC) 
-     CBC W/O DIFF; Future -     METABOLIC PANEL, BASIC; Future 
-     PROTHROMBIN TIME + INR; Future -     URINALYSIS W/ RFLX MICROSCOPIC; Future -     XR CHEST PA LAT; Future 2. Hypercholesteremia 3. Abnormal nuclear cardiac imaging test 
 
4. Erectile dysfunction, unspecified erectile dysfunction type 
-     sildenafiL, pulmonary hypertension, (REVATIO) 20 mg tablet; Take 1 Tab by mouth daily as needed (ercetile dysfunction). TAKE 1 TABLET BY MOUTH AS NEEDED. TAKE 1 TO 5 TABLETS AS DIRECTED. INDICATIONS: [facility]. Pertinent laboratory and test data reviewed and discussed with patient. See patient instructions also for other medical advice given Medications Discontinued During This Encounter Medication Reason  sildenafiL, pulmonary hypertension, (REVATIO) 20 mg tablet Follow-up and Dispositions · Return in about 6 weeks (around 12/31/2020), or if symptoms worsen or fail to improve, for post procedure.

## 2020-11-19 NOTE — PATIENT INSTRUCTIONS
Medications Discontinued During This Encounter Medication Reason  sildenafiL, pulmonary hypertension, (REVATIO) 20 mg tablet Coronary Angiogram: About This Test 
What is a coronary angiogram? 
 
A coronary angiogram is a test to look at the large blood vessels of your heart (coronary arteries). These blood vessels feed blood, oxygen, and nutrients to your heart. Why is this test done? This test is done to check blood flow in your coronary arteries. It can show the size and location of narrowed or blocked sections of an artery. It's done for people who have coronary artery disease, also known as heart disease. The test can show how serious the disease is and how best to treat it. Or it can be done for people who have symptoms of heart disease to find out if there is a problem with the artery. How is the test done? · You will get medicine to help you relax. · A thin tube called a catheter is put into a blood vessel in your groin or arm. · You will get a shot to numb the skin where the catheter goes in. You may feel pressure when the doctor moves the catheter through your blood vessel into your heart. · Dye is put into your coronary arteries through the catheter. Your doctor can see the dye as it moves through the arteries. This lets your doctor look for areas that are narrowed or blocked. · You may feel hot or flushed for several seconds when the dye is put in. 
· If you have a narrowed or blocked artery, the doctor may do an angioplasty or a coronary stent procedure. These procedures make more room for blood to flow. How long does it take? The test will take about 30 minutes. But you need time to get ready for it and time to recover. If you also have angioplasty and possibly a stent placed after the test, the whole procedure may take a few hours.  
What happens after the test? 
· You will stay in a room for at least a few hours to make sure the catheter site starts to heal. You may have a bandage or a compression device on your groin or arm to prevent bleeding. · If the catheter was placed in your groin, you may lie in bed for a few hours. If the catheter was put in your arm, you will need to keep your arm still for at least 1 hour. · You may be able to go home later the same day, or you may need to stay in the hospital overnight. You will get more instructions for what to do at home. · Drink plenty of fluids for several hours after the test. 
Follow-up care is a key part of your treatment and safety. Be sure to make and go to all appointments, and call your doctor if you are having problems. It's also a good idea to know your test results and keep a list of the medicines you take. Where can you learn more? Go to http://www.gray.com/ Enter E195 in the search box to learn more about \"Coronary Angiogram: About This Test.\" Current as of: December 16, 2019               Content Version: 12.6 © 6349-7146 [employer]. Care instructions adapted under license by [employer] (which disclaims liability or warranty for this information). If you have questions about a medical condition or this instruction, always ask your healthcare professional. [address] any warranty or liability for your use of this information.

## 2020-11-19 NOTE — PROGRESS NOTES
HISTORY OF PRESENT ILLNESS  Raymona Soulier Gaynell Balzarine is a 79 y.o. male. Cholesterol Problem   The history is provided by the medical records. This is a chronic problem. Associated symptoms include chest pain and shortness of breath. Pertinent negatives include no headaches. Chest Pain (Angina)    The history is provided by the patient. This is a new problem. The current episode started more than 1 week ago (8/19). The problem has been gradually worsening. The problem occurs constantly. The pain is associated with rest and exertion (Exercises worse on the underlying chronic constant discomfort). The pain is present in the lateral region and left side. The pain does not radiate. Associated symptoms include dizziness, palpitations and shortness of breath. Pertinent negatives include no claudication, no cough, no diaphoresis, no fever, no headaches, no malaise/fatigue, no nausea, no orthopnea, no PND and no vomiting. Palpitations    The history is provided by the patient. This is a new problem. The current episode started more than 1 week ago. On average, each episode lasts 30 seconds. The problem is associated with exercise (Also at rest sometimes). Associated symptoms include chest pain, dizziness and shortness of breath. Pertinent negatives include no diaphoresis, no fever, no malaise/fatigue, no claudication, no orthopnea, no PND, no nausea, no vomiting, no headaches and no cough. Shortness of Breath   The history is provided by the patient. This is a new problem. The problem occurs intermittently. The current episode started more than 1 week ago (7/20). Associated symptoms include chest pain. Pertinent negatives include no fever, no headaches, no cough, no wheezing, no PND, no orthopnea, no vomiting, no rash, no leg swelling and no claudication. The problem's precipitants include exercise. Review of Systems   Constitutional: Negative for chills, diaphoresis, fever, malaise/fatigue and weight loss.    HENT: Negative for nosebleeds. Eyes: Negative for discharge. Respiratory: Positive for shortness of breath. Negative for cough and wheezing. Cardiovascular: Positive for chest pain and palpitations. Negative for orthopnea, claudication, leg swelling and PND. Gastrointestinal: Negative for diarrhea, nausea and vomiting. Genitourinary: Negative for dysuria and hematuria. Musculoskeletal: Negative for joint pain. Skin: Negative for rash. Neurological: Positive for dizziness. Negative for seizures, loss of consciousness and headaches. Endo/Heme/Allergies: Negative for polydipsia. Does not bruise/bleed easily. Psychiatric/Behavioral: Negative for depression and substance abuse. The patient does not have insomnia. No Known Allergies    Past Medical History:   Diagnosis Date    Arthritis     BPH (benign prostatic hyperplasia)     Hemorrhoids     Hemospermia     High cholesterol     Impotence of organic origin     Inguinal hernia     Insomnia     Rt groin pain     Urge incontinence        Family History   Problem Relation Age of Onset    Breast Cancer Mother     Cancer Father     Stroke Father     Heart Attack Father     Cancer Sister         hodgkins    Coronary Artery Disease Sister        Social History     Tobacco Use    Smoking status: Former Smoker     Last attempt to quit: 1985     Years since quittin.8    Smokeless tobacco: Never Used   Substance Use Topics    Alcohol use: Yes     Alcohol/week: 3.0 standard drinks     Types: 2 Glasses of wine, 1 Shots of liquor per week     Frequency: 2-3 times a week     Drinks per session: 1 or 2     Binge frequency: Never    Drug use: Never        Current Outpatient Medications   Medication Sig    amoxicillin-clavulanate (AUGMENTIN) 500-125 mg per tablet Take 1 Tab by mouth two (2) times a day for 7 days.  traZODone (DESYREL) 50 mg tablet Take 50 mg by mouth nightly.  PRN    sildenafiL, pulmonary hypertension, (REVATIO) 20 mg tablet TAKE 1 TABLET BY MOUTH AS NEEDED. TAKE 1 TO 5 TABLETS AS DIRECTED. INDICATIONS: [facility]. (Patient taking differently: Take 20 mg by mouth daily. TAKE 1 TABLET BY MOUTH.)    OTHER,NON-FORMULARY, 1,000 mg. Black currant oil    ascorbic acid, vitamin C, (Vitamin C) 500 mg tablet Take 500 mg by mouth daily.  bisacodyL (Dulcolax, bisacodyl,) 5 mg EC tablet Take 5 mg by mouth daily as needed for Constipation.  Lactobacillus acidophilus (Probiotic) 10 billion cell cap Take  by mouth.  tamsulosin (FLOMAX) 0.4 mg capsule TAKE 1 CAPSULE BY MOUTH EVERY DAY    calcium-cholecalciferol, d3, (CALCIUM 600 + D) 600-125 mg-unit tab Take  by mouth.  coenzyme q10 300 mg cap Take  by mouth.  docusate sodium 100 mg tab Take  by mouth.  dorzolamide (TRUSOPT) 2 % ophthalmic solution INSTILL 1 DROP INTO BOTH EYES TWICE A DAY    LUMIGAN 0.01 % ophthalmic drops     COMBIGAN 0.2-0.5 % drop ophthalmic solution     simvastatin (ZOCOR) 20 mg tablet Take 20 mg by mouth nightly.  b complex vitamins (B COMPLEX 1) tablet Take 1 Tab by mouth daily.  omega-3 fatty acids-vitamin e (FISH OIL) 1,000 mg cap Take 1 Cap by mouth.  GLUCOSAM/MSM/CHOND/PVQ451/HYAL (GLUCOSAMINE-CHONDROITIN-MSM PO) Take  by mouth.  Flaxseed Oil Oil by Does Not Apply route.  melatonin 3 mg tablet Take 10 mg by mouth as needed.  cycloSPORINE (RESTASIS) 0.05 % ophthalmic emulsion Administer 1 Drop to both eyes two (2) times a day.  aspirin 81 mg tablet Take 81 mg by mouth. 2 tabs daily    loteprednol etabonate (LOTEMAX) 0.5 % ophthalmic suspension Administer 1 Drop to both eyes four (4) times daily. No current facility-administered medications for this visit.          Past Surgical History:   Procedure Laterality Date    HX CATARACT REMOVAL      HX HERNIA REPAIR      HX KNEE ARTHROSCOPY      HX TURP         Visit Vitals  BP (!) 112/51 (BP 1 Location: Left arm, BP Patient Position: Sitting)   Pulse (!) 49 Temp 97.7 °F (36.5 °C) (Temporal)   Ht 6' (1.829 m)   Wt 95.3 kg (210 lb)   SpO2 100%   BMI 28.48 kg/m²       Diagnostic Studies:  I have reviewed the relevant tests done on the patient and show as follows  EKG tracings reviewed by me today. EKG Results     None        XR Results (most recent):  Results from Hospital Encounter encounter on 01/21/20   XR CHEST PORT    Narrative Portable CXR:    COMPARISON: None    HISTORY: Chest pain. Cardiac silhouette is normal in size with mild tortuosity of the thoracic aorta  and mild aortic arch calcification. No vascular congestion. Lungs and  costophrenic angles are clear. Impression IMPRESSION: No acute pulmonary disease     01/27/20   NUCLEAR CARDIAC STRESS TEST 01/27/2020 1/27/2020    Narrative · Baseline ECG: Sinus bradycardia, non-specific ST-T wave abnormalities. · Negative stress test.  · Moderate risk Duke treadmill score. · Gated SPECT: Left ventricular function post-stress was normal.   Calculated ejection fraction is 56%. There is no evidence of transient   ischemic dilation (TID). The TID ratio is 1.05. · Left ventricular perfusion is normal.  · Negative myocardial perfusion imaging. Myocardial perfusion imaging   supports a low risk stress test.        Signed by: Fabiano Canales MD          Mr. Karla Valles has a reminder for a \"due or due soon\" health maintenance. I have asked that he contact his primary care provider for follow-up on this health maintenance. Physical Exam   Constitutional: He is oriented to person, place, and time. He appears well-developed and well-nourished. No distress. HENT:   Head: Normocephalic and atraumatic. Mouth/Throat: Normal dentition. Eyes: Right eye exhibits no discharge. Left eye exhibits no discharge. No scleral icterus. Neck: Neck supple. No JVD present. Carotid bruit is not present. No thyromegaly present.    Cardiovascular: Normal rate, regular rhythm, S1 normal, S2 normal, normal heart sounds and intact distal pulses. Exam reveals no gallop and no friction rub. No murmur heard. Pulmonary/Chest: Effort normal and breath sounds normal. He has no wheezes. He has no rales. Abdominal: Soft. He exhibits no mass. There is no abdominal tenderness. Musculoskeletal:         General: No edema. Lymphadenopathy:        Right cervical: No superficial cervical adenopathy present. Left cervical: No superficial cervical adenopathy present. Neurological: He is alert and oriented to person, place, and time. Skin: Skin is warm and dry. No rash noted. Psychiatric: He has a normal mood and affect. His behavior is normal.       ASSESSMENT and PLAN    HLD : Results for Virginie Person (MRN [id number]) as of 11/5/2020 08:45   Ref. Range 2/6/2020 09:19   Triglyceride Latest Ref Range: <150 MG/DL 63   Cholesterol, total Latest Ref Range: <200 MG/   HDL Cholesterol Latest Ref Range: 40 - 60 MG/DL 55   CHOL/HDL Ratio Latest Ref Range: 0 - 5.0   2.1   VLDL, calculated Latest Units: MG/DL 12.6   LDL, calculated Latest Ref Range: 0 - 100 MG/DL 49.4       Chest pain is atypical.  Stress test has not revealed any definite perfusion defects. Possible diaphragmatic attenuation is noted. Continues to have chest discomfort which worsens consistently with exertion suggesting possible angina. Patient wants heart catheterization and I agree with it. Lipids are excellent now. Continue statins for hyperlipidemia. The benefits and risks of cardiac catheterization have been discussed in detail with the patient present at the time.  Patient understands  risk of potential cath complications including but not limited to bleeding, infection, difficulty healing the arteriotomy access site(2 chances in 100) which may require surgical repair, potential thromboembolic complications which could result in stroke, myocardial infarction, vascular injury, loss of limb or organ function and/or death( 1 chance in 1000)and potential allergic reaction to contrast dye or other medication used during the procedure. Patient is also aware of the therapeutic implications for medical management vs coronary artery bypass surgery vs percutaneous coronary intervention in treatment of coronary artery disease. The additional risks for percutaneous coronary artery intervention include the need for emergent bypass surgery at 1 chance in 100 and for restenosis which may range from 35% for plain balloon angioplasty, 15% for bare metal stent, and 5% for drug eluting stent implants. The need for mandatory uninterrupted dual antiplatelet therapy with lifelong Aspirin combined with Plavix for up to 12 months following drug eluting stents, and minimum 1 month following bare metal stents to prevent stent thrombosis which is the equivalent of acute heart attack has been reviewed in detail. No contraindications to use of drug eluting stents has been discovered. Diagnoses and all orders for this visit:    1. Atypical angina (HCC)  -     CBC W/O DIFF; Future  -     METABOLIC PANEL, BASIC; Future  -     PROTHROMBIN TIME + INR; Future  -     URINALYSIS W/ RFLX MICROSCOPIC; Future  -     XR CHEST PA LAT; Future    2. Hypercholesteremia    3. Abnormal nuclear cardiac imaging test    4. Erectile dysfunction, unspecified erectile dysfunction type  -     sildenafiL, pulmonary hypertension, (REVATIO) 20 mg tablet; Take 1 Tab by mouth daily as needed (ercetile dysfunction). TAKE 1 TABLET BY MOUTH AS NEEDED. TAKE 1 TO 5 TABLETS AS DIRECTED. INDICATIONS: [facility]. Pertinent laboratory and test data reviewed and discussed with patient.   See patient instructions also for other medical advice given    Medications Discontinued During This Encounter   Medication Reason    sildenafiL, pulmonary hypertension, (REVATIO) 20 mg tablet        Follow-up and Dispositions    · Return in about 6 weeks (around 12/31/2020), or if symptoms worsen or fail to improve, for post procedure.

## 2020-11-25 ENCOUNTER — HOSPITAL ENCOUNTER (OUTPATIENT)
Dept: GENERAL RADIOLOGY | Age: 80
Discharge: HOME | End: 2020-11-25
Payer: MEDICARE

## 2020-11-25 ENCOUNTER — HOSPITAL ENCOUNTER (OUTPATIENT)
Dept: PREADMISSION TESTING | Age: 80
Discharge: HOME | End: 2020-11-25
Payer: MEDICARE

## 2020-11-25 DIAGNOSIS — I20.8 ATYPICAL ANGINA (HCC): ICD-10-CM

## 2020-11-25 LAB
ANION GAP SERPL CALC-SCNC: 4 MMOL/L (ref 3–18)
APPEARANCE UR: CLEAR
BILIRUB UR QL: NEGATIVE
BUN SERPL-MCNC: 14 MG/DL (ref 7–18)
BUN/CREAT SERPL: 13 (ref 12–20)
CALCIUM SERPL-MCNC: 9.5 MG/DL (ref 8.5–10.1)
CHLORIDE SERPL-SCNC: 108 MMOL/L (ref 100–111)
CO2 SERPL-SCNC: 30 MMOL/L (ref 21–32)
COLOR UR: YELLOW
CREAT SERPL-MCNC: 1.05 MG/DL (ref 0.6–1.3)
ERYTHROCYTE [DISTWIDTH] IN BLOOD BY AUTOMATED COUNT: 13.1 % (ref 11.6–14.5)
GLUCOSE SERPL-MCNC: 82 MG/DL (ref 74–99)
GLUCOSE UR STRIP.AUTO-MCNC: NEGATIVE MG/DL
HCT VFR BLD AUTO: 44.7 % (ref 36–48)
HGB BLD-MCNC: 15 G/DL (ref 13–16)
HGB UR QL STRIP: NEGATIVE
INR PPP: 1 (ref 0.8–1.2)
KETONES UR QL STRIP.AUTO: NEGATIVE MG/DL
LEUKOCYTE ESTERASE UR QL STRIP.AUTO: NEGATIVE
MCH RBC QN AUTO: 32.4 PG (ref 24–34)
MCHC RBC AUTO-ENTMCNC: 33.6 G/DL (ref 31–37)
MCV RBC AUTO: 96.5 FL (ref 74–97)
NITRITE UR QL STRIP.AUTO: NEGATIVE
PH UR STRIP: 5 [PH] (ref 5–8)
PLATELET # BLD AUTO: 188 K/UL (ref 135–420)
PMV BLD AUTO: 11.5 FL (ref 9.2–11.8)
POTASSIUM SERPL-SCNC: 4.1 MMOL/L (ref 3.5–5.5)
PROT UR STRIP-MCNC: NEGATIVE MG/DL
PROTHROMBIN TIME: 13.5 SEC (ref 11.5–15.2)
RBC # BLD AUTO: 4.63 M/UL (ref 4.7–5.5)
SODIUM SERPL-SCNC: 142 MMOL/L (ref 136–145)
SP GR UR REFRACTOMETRY: 1.02 (ref 1–1.03)
UROBILINOGEN UR QL STRIP.AUTO: 0.2 EU/DL (ref 0.2–1)
WBC # BLD AUTO: 8 K/UL (ref 4.6–13.2)

## 2020-11-25 PROCEDURE — 36415 COLL VENOUS BLD VENIPUNCTURE: CPT

## 2020-11-25 PROCEDURE — 80048 BASIC METABOLIC PNL TOTAL CA: CPT

## 2020-11-25 PROCEDURE — 71046 X-RAY EXAM CHEST 2 VIEWS: CPT

## 2020-11-25 PROCEDURE — 81003 URINALYSIS AUTO W/O SCOPE: CPT

## 2020-11-25 PROCEDURE — 85027 COMPLETE CBC AUTOMATED: CPT

## 2020-11-25 PROCEDURE — 85610 PROTHROMBIN TIME: CPT

## 2020-12-01 ENCOUNTER — HOSPITAL ENCOUNTER (OUTPATIENT)
Age: 80
Setting detail: OUTPATIENT SURGERY
Discharge: HOME | End: 2020-12-01
Attending: INTERNAL MEDICINE | Admitting: INTERNAL MEDICINE
Payer: MEDICARE

## 2020-12-01 VITALS
WEIGHT: 206 LBS | SYSTOLIC BLOOD PRESSURE: 146 MMHG | DIASTOLIC BLOOD PRESSURE: 68 MMHG | TEMPERATURE: 97.8 F | BODY MASS INDEX: 27.9 KG/M2 | HEIGHT: 72 IN | OXYGEN SATURATION: 99 % | HEART RATE: 70 BPM | RESPIRATION RATE: 25 BRPM

## 2020-12-01 DIAGNOSIS — I20.8 ANGINA AT REST (HCC): ICD-10-CM

## 2020-12-01 LAB — ACT BLD: 147 SECS (ref 79–138)

## 2020-12-01 PROCEDURE — 85347 COAGULATION TIME ACTIVATED: CPT

## 2020-12-01 PROCEDURE — 99152 MOD SED SAME PHYS/QHP 5/>YRS: CPT | Performed by: INTERNAL MEDICINE

## 2020-12-01 PROCEDURE — C1894 INTRO/SHEATH, NON-LASER: HCPCS | Performed by: INTERNAL MEDICINE

## 2020-12-01 PROCEDURE — 93458 L HRT ARTERY/VENTRICLE ANGIO: CPT | Performed by: INTERNAL MEDICINE

## 2020-12-01 PROCEDURE — 74011000250 HC RX REV CODE- 250: Performed by: INTERNAL MEDICINE

## 2020-12-01 PROCEDURE — 77030016699 HC CATH ANGI DX INFN1 CARD -A: Performed by: INTERNAL MEDICINE

## 2020-12-01 PROCEDURE — 74011250636 HC RX REV CODE- 250/636: Performed by: INTERNAL MEDICINE

## 2020-12-01 PROCEDURE — 74011250637 HC RX REV CODE- 250/637: Performed by: INTERNAL MEDICINE

## 2020-12-01 PROCEDURE — 77030013797 HC KT TRNSDUC PRSSR EDWD -A: Performed by: INTERNAL MEDICINE

## 2020-12-01 PROCEDURE — 99153 MOD SED SAME PHYS/QHP EA: CPT | Performed by: INTERNAL MEDICINE

## 2020-12-01 PROCEDURE — 74011000636 HC RX REV CODE- 636: Performed by: INTERNAL MEDICINE

## 2020-12-01 RX ORDER — SODIUM CHLORIDE 0.9 % (FLUSH) 0.9 %
5-40 SYRINGE (ML) INJECTION AS NEEDED
Status: DISCONTINUED | OUTPATIENT
Start: 2020-12-01 | End: 2020-12-01 | Stop reason: HOSPADM

## 2020-12-01 RX ORDER — ACETAMINOPHEN 325 MG/1
650 TABLET ORAL
Status: DISCONTINUED | OUTPATIENT
Start: 2020-12-01 | End: 2020-12-01 | Stop reason: HOSPADM

## 2020-12-01 RX ORDER — HYDRALAZINE HYDROCHLORIDE 20 MG/ML
10 INJECTION INTRAMUSCULAR; INTRAVENOUS
Status: DISCONTINUED | OUTPATIENT
Start: 2020-12-01 | End: 2020-12-01 | Stop reason: HOSPADM

## 2020-12-01 RX ORDER — MIDAZOLAM HYDROCHLORIDE 1 MG/ML
INJECTION, SOLUTION INTRAMUSCULAR; INTRAVENOUS AS NEEDED
Status: DISCONTINUED | OUTPATIENT
Start: 2020-12-01 | End: 2020-12-01 | Stop reason: HOSPADM

## 2020-12-01 RX ORDER — FENTANYL CITRATE 50 UG/ML
INJECTION, SOLUTION INTRAMUSCULAR; INTRAVENOUS AS NEEDED
Status: DISCONTINUED | OUTPATIENT
Start: 2020-12-01 | End: 2020-12-01 | Stop reason: HOSPADM

## 2020-12-01 RX ORDER — NITROGLYCERIN 0.4 MG/1
0.4 TABLET SUBLINGUAL
Status: DISCONTINUED | OUTPATIENT
Start: 2020-12-01 | End: 2020-12-01 | Stop reason: HOSPADM

## 2020-12-01 RX ORDER — SODIUM CHLORIDE 0.9 % (FLUSH) 0.9 %
5-40 SYRINGE (ML) INJECTION EVERY 8 HOURS
Status: DISCONTINUED | OUTPATIENT
Start: 2020-12-01 | End: 2020-12-01 | Stop reason: HOSPADM

## 2020-12-01 RX ORDER — SODIUM CHLORIDE 9 MG/ML
100 INJECTION, SOLUTION INTRAVENOUS CONTINUOUS
Status: DISPENSED | OUTPATIENT
Start: 2020-12-01 | End: 2020-12-01

## 2020-12-01 RX ORDER — SODIUM CHLORIDE 9 MG/ML
125 INJECTION, SOLUTION INTRAVENOUS CONTINUOUS
Status: DISPENSED | OUTPATIENT
Start: 2020-12-01 | End: 2020-12-01

## 2020-12-01 RX ORDER — ASPIRIN 325 MG
325 TABLET ORAL DAILY
Status: DISCONTINUED | OUTPATIENT
Start: 2020-12-01 | End: 2020-12-01 | Stop reason: HOSPADM

## 2020-12-01 RX ORDER — LIDOCAINE HYDROCHLORIDE 10 MG/ML
INJECTION, SOLUTION EPIDURAL; INFILTRATION; INTRACAUDAL; PERINEURAL AS NEEDED
Status: DISCONTINUED | OUTPATIENT
Start: 2020-12-01 | End: 2020-12-01 | Stop reason: HOSPADM

## 2020-12-01 RX ADMIN — SODIUM CHLORIDE 125 ML/HR: 900 INJECTION, SOLUTION INTRAVENOUS at 12:04

## 2020-12-01 RX ADMIN — SODIUM CHLORIDE 100 ML/HR: 900 INJECTION, SOLUTION INTRAVENOUS at 10:47

## 2020-12-01 RX ADMIN — Medication 10 ML: at 16:07

## 2020-12-01 NOTE — Clinical Note
TRANSFER - OUT REPORT:     Verbal report given to: Amanda Benoit RN. Report consisted of patient's Situation, Background, Assessment and   Recommendations(SBAR). Opportunity for questions and clarification was provided. Patient transported with a Cardiac Cath Tech / Patient Care Tech. Patient transported to: [address]

## 2020-12-01 NOTE — Clinical Note
TRANSFER - IN REPORT:     Verbal report received from: Rajani Ruby RN. Report consisted of patient's Situation, Background, Assessment and   Recommendations(SBAR). Opportunity for questions and clarification was provided. Assessment completed upon patient's arrival to unit and care assumed. Patient transported with a Cardiac Cath Tech / Patient Care Tech.

## 2020-12-01 NOTE — INTERVAL H&P NOTE
Update History & Physical 
 
The Patient's History and Physical of December 1, 2020, Card Cath/poss PCI was reviewed with the patient and I examined the patient. There was no change. The surgical site was confirmed by the patient and me. Plan:  The risk, benefits, expected outcome, and alternative to the recommended procedure have been discussed with the patient. Patient understands and wants to proceed with the procedure.  
 
Electronically signed by Ulises Jorgensen MD on 12/1/2020 at 10:57 AM

## 2020-12-01 NOTE — PROGRESS NOTES
Cardiac catheterization done without complications. Right radial artery could not be cannulated, likely secondary to atherosclerosis in the artery. Procedure done via right femoral artery without any complications. Conclusion        · Normal epicardial coronary arteries other than minor luminal irregularities. · Normal LVEDP at 12 mm. · Procedure done via right femoral artery without any complications using 4 Turkish catheters.      Risk factor modification for coronary disease and may consider further testing for atypical anginal chest discomfort.

## 2020-12-01 NOTE — Clinical Note
Right groin and right radial prepped with ChloraPrep and draped. Wet prep solution applied at: 1108. Wet prep solution dried at: 1111. Wet prep elapsed drying time: 3 mins.

## 2020-12-01 NOTE — Clinical Note
Contrast Dose Calculator:   Patient's age: 79.   Patient's sex: Male. Patient weight (kg) = 93. Creatinine level (mg/dL) = 1.05. Creatinine clearance (mL/min): 75.04. Max Contrast dose per Creatinine Cl calculator = 168.84 mL.

## 2020-12-01 NOTE — DISCHARGE INSTRUCTIONS
HEART CATHETERIZATION/ANGIOGRAPHY DISCHARGE INSTRUCTIONS    1. Check puncture site frequently for swelling or bleeding. If there is any bleeding, lie down and apply pressure over the area with a clean towel or washcloth. Notify your doctor for any redness, swelling, drainage, or oozing from the puncture site. Notify your doctor for any fever or chills. 2. If the extremity becomes cold, numb, or painful go to the Emergency Room. 3. Activity should be limited for the next 48 hours. Climb stairs as little as possible and avoid any stooping, bending, or strenuous activity for 48 hours. No heavy lifting (anything over 8 pounds) for 5 days. 4. You may resume your usual diet. Drink more fluids than usual.  5. Have a responsible person drive you home and stay with you for at least 24 hours after your heart catheterization/angiography. 6. You may remove bandage from your Right and Groin in 24 hours. You may shower in 24 hours. No tub baths, hot tubs, or swimming for 1 week. Do not place any lotions, creams, powders, or ointments over puncture site for 1 week. You may place a clean band-aid over the puncture site each day for 5 days. Change daily. 7. If you take Metformin, do not take it for 48 hours. 8. Ask your nurse when to restart any blood thinners. How can you care for yourself at home? Activity  · Do not do strenuous exercise and do not lift, pull, or push anything heavy until your doctor says it is okay. This may be for a day or two. You can walk around the house and do light activity, such as cooking. · You may shower 24 to 48 hours after the procedure, if your doctor okays it. Pat the incision dry. Do not take a bath for 1 week, or until your doctor tells you it is okay. · If the catheter was placed in your groin, try not to walk up stairs for the first couple of days. · If the catheter was placed in your arm near your wrist, do not bend your wrist deeply for the first couple of days.  Be careful using your hand to get into and out of a chair or bed. · If your doctor recommends it, get more exercise. Walking is a good choice. Bit by bit, increase the amount you walk every day. Try for at least 30 minutes on most days of the week. Diet  · Drink plenty of fluids to help your body flush out the dye. If you have kidney, heart, or liver disease and have to limit fluids, talk with your doctor before you increase the amount of fluids you drink. · Keep eating a heart-healthy diet that has lots of fruits, vegetables, and whole grains. If you have not been eating this way, talk to your doctor. You also may want to talk to a dietitian. This expert can help you to learn about healthy foods and plan meals. Medicines  · Your doctor will tell you if and when you can restart your medicines. He or she will also give you instructions about taking any new medicines. · If you take blood thinners, such as warfarin (Coumadin), clopidogrel (Plavix), or aspirin, be sure to talk to your doctor. He or she will tell you if and when to start taking those medicines again. Make sure that you understand exactly what your doctor wants you to do. · Your doctor may prescribe a blood-thinning medicine like aspirin or clopidogrel (Plavix). It is very important that you take these medicines exactly as directed in order to keep the coronary artery open and reduce your risk of a heart attack. Be safe with medicines. Call your doctor if you think you are having a problem with your medicine. Care of the catheter site  · For the first 3 days, keep a bandage over the spot where the catheter was inserted. · Put ice or a cold pack on the area for 10 to 20 minutes at a time to help with soreness or swelling. Put a thin cloth between the ice and your skin. Sedation for a Medical Procedure: Care Instructions  Your Care Instructions  For a minor procedure or surgery, you will get a sedative to help you relax. This drug will make you sleepy.  It is usually given in a vein (by IV). A shot may also be used to numb the area. If you had local anesthesia, you may feel some pain and discomfort as it wears off. If you have pain, don't be afraid to say so. Pain medicine works better if you take it before the pain gets bad. Common side effects from sedation include:  · Feeling sleepy. (Your doctors and nurses will make sure you are not too sleepy to go home.)  · Nausea and vomiting. This usually does not last long. · Feeling tired. Follow-up care is a key part of your treatment and safety. Be sure to make and go to all appointments, and call your doctor if you are having problems. It's also a good idea to know your test results and keep a list of the medicines you take. How can you care for yourself at home? Activity  · Don't do anything for 24 hours that requires attention to detail. It takes time for the medicine effects to completely wear off. · For your safety, you should not drive or operate any machinery that could be dangerous until the medicine wears off and you can think clearly and react easily. · Rest when you feel tired. Getting enough sleep will help you recover. Diet  · You can eat your normal diet, unless your doctor gives you other instructions. If your stomach is upset, try clear liquids and bland, low-fat foods like plain toast or rice. · Drink plenty of fluids (unless your doctor tells you not to). · Don't drink alcohol for 24 hours. Medicines  · Be safe with medicines. Read and follow all instructions on the label. ¨ If the doctor gave you a prescription medicine for pain, take it as prescribed. ¨ If you are not taking a prescription pain medicine, ask your doctor if you can take an over-the-counter medicine. · If you think your pain medicine is making you sick to your stomach:  ¨ Take your medicine after meals (unless your doctor has told you not to). ¨ Ask your doctor for a different pain medicine.     Follow-up care is a key part of your treatment and safety. Be sure to make and go to all appointments, and call your doctor if you are having problems. It's also a good idea to know your test results and keep a list of the medicines you take. When should you call for help? Call 911 anytime you think you may need emergency care. For example, call if:  · You passed out (lost consciousness). · You have severe trouble breathing. · You have sudden chest pain and shortness of breath, or you cough up blood. · You have symptoms of a heart attack. These may include:  ¨ Chest pain or pressure, or a strange feeling in the chest.  ¨ Sweating. ¨ Shortness of breath. ¨ Nausea or vomiting. ¨ Pain, pressure, or a strange feeling in the back, neck, jaw, or upper belly, or in one or both shoulders or arms. ¨ Lightheadedness or sudden weakness. ¨ A fast or irregular heartbeat. After you call 911, the  may tel you to chew 1 adult-strength or 2 to 4 low-dose aspirin. Wait for an ambulance. Do not try to drive yourself. · You have been diagnosed with angina, and you have symptoms that do not go away with rest or are not getting better within 5 minutes after you take a dose of nitroglycerin. Call your doctor now or seek immediate medical care if:  · You are bleeding from the area where the catheter was put in your artery. · You have a fast-growing, painful lump at the catheter site. · You have signs of infection, such as:  ¨ Increased pain, swelling, warmth, or redness. ¨ Red streaks leading from the catheter site. ¨ Pus draining from the catheter site. ¨ A fever. · Your leg or arm looks blue or feels cold, numb, or tingly. These are general instructions for a healthy lifestyle:    No smoking/ No tobacco products/ Avoid exposure to second hand smoke    Surgeon General's Warning:  Quitting smoking now greatly reduces serious risk to your health.     Obesity, smoking, and sedentary lifestyle greatly increases your risk for illness    A healthy diet, regular physical exercise & weight monitoring are important for maintaining a healthy lifestyle    You may be retaining fluid if you have a history of heart failure or if you experience any of the following symptoms:  Weight gain of 3 pounds or more overnight or 5 pounds in a week, increased swelling in our hands or feet or shortness of breath while lying flat in bed. Please call your doctor as soon as you notice any of these symptoms; do not wait until your next office visit. Recognize signs and symptoms of STROKE:    F-face looks uneven    A-arms unable to move or move unevenly    S-speech slurred or non-existent    T-time-call 911 as soon as signs and symptoms begin-DO NOT go       Back to bed or wait to see if you get better-TIME IS BRAIN. Warning Signs of HEART ATTACK     Call 911 if you have these symptoms:   Chest discomfort. Most heart attacks involve discomfort in the center of the chest that lasts more than a few minutes, or that goes away and comes back. It can feel like uncomfortable pressure, squeezing, fullness, or pain.  Discomfort in other areas of the upper body. Symptoms can include pain or discomfort in one or both arms, the back, neck, jaw, or stomach.  Shortness of breath with or without chest discomfort.  Other signs may include breaking out in a cold sweat, nausea, or lightheadedness. Don't wait more than five minutes to call 911 - MINUTES MATTER! Fast action can save your life. Calling 911 is almost always the fastest way to get lifesaving treatment. Emergency Medical Services staff can begin treatment when they arrive -- up to an hour sooner than if someone gets to the hospital by car.

## 2020-12-01 NOTE — PROGRESS NOTES
TRANSFER - IN REPORT:    Verbal report received from Alonso Quezada (name) on [facility].  being received from Cath Lab (unit) for routine post - op      Report consisted of patients Situation, Background, Assessment and   Recommendations(SBAR). Information from the following report(s) SBAR, Kardex, Procedure Summary, Intake/Output and MAR was reviewed with the receiving nurse. Opportunity for questions and clarification was provided. Assessment completed upon patients arrival to unit and care assumed.

## 2021-01-14 ENCOUNTER — OFFICE VISIT (OUTPATIENT)
Dept: CARDIOLOGY CLINIC | Age: 81
End: 2021-01-14
Payer: MEDICARE

## 2021-01-14 VITALS
TEMPERATURE: 98.2 F | RESPIRATION RATE: 16 BRPM | BODY MASS INDEX: 28.79 KG/M2 | OXYGEN SATURATION: 97 % | WEIGHT: 212.6 LBS | HEART RATE: 55 BPM | HEIGHT: 72 IN | SYSTOLIC BLOOD PRESSURE: 124 MMHG | DIASTOLIC BLOOD PRESSURE: 57 MMHG

## 2021-01-14 DIAGNOSIS — I20.8 ATYPICAL ANGINA (HCC): Primary | ICD-10-CM

## 2021-01-14 DIAGNOSIS — E78.00 HYPERCHOLESTEREMIA: ICD-10-CM

## 2021-01-14 PROCEDURE — G8419 CALC BMI OUT NRM PARAM NOF/U: HCPCS | Performed by: INTERNAL MEDICINE

## 2021-01-14 PROCEDURE — G8536 NO DOC ELDER MAL SCRN: HCPCS | Performed by: INTERNAL MEDICINE

## 2021-01-14 PROCEDURE — 99213 OFFICE O/P EST LOW 20 MIN: CPT | Performed by: INTERNAL MEDICINE

## 2021-01-14 PROCEDURE — G8427 DOCREV CUR MEDS BY ELIG CLIN: HCPCS | Performed by: INTERNAL MEDICINE

## 2021-01-14 PROCEDURE — G8432 DEP SCR NOT DOC, RNG: HCPCS | Performed by: INTERNAL MEDICINE

## 2021-01-14 PROCEDURE — 1101F PT FALLS ASSESS-DOCD LE1/YR: CPT | Performed by: INTERNAL MEDICINE

## 2021-01-14 RX ORDER — DICLOFENAC SODIUM 10 MG/G
GEL TOPICAL 4 TIMES DAILY
COMMUNITY

## 2021-01-14 NOTE — PROGRESS NOTES
HISTORY OF PRESENT ILLNESS  Oli Restrepo is a 79 y.o. male. Chest Pain (Angina)   The history is provided by the patient. This is a new problem. The current episode started more than 1 week ago (8/19). The problem has been resolved. The problem occurs constantly. The pain is associated with rest and exertion (Exercises worse on the underlying chronic constant discomfort). The pain is present in the lateral region and left side. The pain does not radiate. Pertinent negatives include no claudication, no cough, no diaphoresis, no dizziness, no fever, no headaches, no malaise/fatigue, no nausea, no orthopnea, no palpitations, no PND, no shortness of breath and no vomiting. Shortness of Breath  The history is provided by the patient. This is a new problem. The problem occurs intermittently. The current episode started more than 1 week ago (7/20). The problem has been resolved. Pertinent negatives include no fever, no headaches, no cough, no wheezing, no PND, no orthopnea, no chest pain, no vomiting, no rash, no leg swelling and no claudication. The problem's precipitants include exercise. Cholesterol Problem  The history is provided by the medical records. This is a chronic problem. Pertinent negatives include no chest pain, no headaches and no shortness of breath. Palpitations   The history is provided by the patient. This is a new problem. The current episode started more than 1 week ago. The problem has been resolved. On average, each episode lasts 30 seconds. The problem is associated with exercise (Also at rest sometimes). Pertinent negatives include no diaphoresis, no fever, no malaise/fatigue, no chest pain, no claudication, no orthopnea, no PND, no nausea, no vomiting, no headaches, no dizziness, no cough and no shortness of breath. Follow-up  The history is provided by the medical records and patient (post cath). Pertinent negatives include no chest pain, no headaches and no shortness of breath. Review of Systems   Constitutional: Negative for chills, diaphoresis, fever, malaise/fatigue and weight loss. HENT: Negative for nosebleeds. Eyes: Negative for discharge. Respiratory: Negative for cough, shortness of breath and wheezing. Cardiovascular: Negative for chest pain, palpitations, orthopnea, claudication, leg swelling and PND. Gastrointestinal: Negative for diarrhea, nausea and vomiting. Genitourinary: Negative for dysuria and hematuria. Musculoskeletal: Negative for joint pain. Skin: Negative for rash. Neurological: Negative for dizziness, seizures, loss of consciousness and headaches. Endo/Heme/Allergies: Negative for polydipsia. Does not bruise/bleed easily. Psychiatric/Behavioral: Negative for depression and substance abuse. The patient does not have insomnia. No Known Allergies    Past Medical History:   Diagnosis Date    Arthritis     BPH (benign prostatic hyperplasia)     Hemorrhoids     Hemospermia     High cholesterol     Impotence of organic origin     Inguinal hernia     Insomnia     Rt groin pain     Urge incontinence        Family History   Problem Relation Age of Onset    Breast Cancer Mother     Cancer Father     Stroke Father     Heart Attack Father     Cancer Sister         hodgkins    Coronary Artery Disease Sister        Social History     Tobacco Use    Smoking status: Former Smoker     Quit date: 1985     Years since quittin.9    Smokeless tobacco: Never Used   Substance Use Topics    Alcohol use: Yes     Alcohol/week: 3.0 standard drinks     Types: 2 Glasses of wine, 1 Shots of liquor per week     Frequency: 2-3 times a week     Drinks per session: 1 or 2     Binge frequency: Never    Drug use: Never        Current Outpatient Medications   Medication Sig    diclofenac (Voltaren) 1 % gel Apply  to affected area four (4) times daily.  brimonidine tartrate (ALPHAGAN P OP) Apply  to eye.     sildenafiL, pulmonary hypertension, (REVATIO) 20 mg tablet Take 1 Tab by mouth daily as needed (ercetile dysfunction). TAKE 1 TABLET BY MOUTH AS NEEDED. TAKE 1 TO 5 TABLETS AS DIRECTED. INDICATIONS: [facility].  traZODone (DESYREL) 50 mg tablet Take 50 mg by mouth nightly. PRN    OTHER,NON-FORMULARY, 1,000 mg. Black currant oil    ascorbic acid, vitamin C, (Vitamin C) 500 mg tablet Take 500 mg by mouth daily.  bisacodyL (Dulcolax, bisacodyl,) 5 mg EC tablet Take 5 mg by mouth daily as needed for Constipation.  Lactobacillus acidophilus (Probiotic) 10 billion cell cap Take  by mouth.  tamsulosin (FLOMAX) 0.4 mg capsule TAKE 1 CAPSULE BY MOUTH EVERY DAY    calcium-cholecalciferol, d3, (CALCIUM 600 + D) 600-125 mg-unit tab Take  by mouth.  coenzyme q10 300 mg cap Take  by mouth.  docusate sodium 100 mg tab Take  by mouth.  dorzolamide (TRUSOPT) 2 % ophthalmic solution INSTILL 1 DROP INTO BOTH EYES TWICE A DAY    LUMIGAN 0.01 % ophthalmic drops     COMBIGAN 0.2-0.5 % drop ophthalmic solution     simvastatin (ZOCOR) 20 mg tablet Take 20 mg by mouth nightly.  b complex vitamins (B COMPLEX 1) tablet Take 1 Tab by mouth daily.  omega-3 fatty acids-vitamin e (FISH OIL) 1,000 mg cap Take 1 Cap by mouth.  GLUCOSAM/MSM/CHOND/QOV703/HYAL (GLUCOSAMINE-CHONDROITIN-MSM PO) Take  by mouth.  Flaxseed Oil Oil by Does Not Apply route.  melatonin 3 mg tablet Take 10 mg by mouth as needed.  cycloSPORINE (RESTASIS) 0.05 % ophthalmic emulsion Administer 1 Drop to both eyes two (2) times a day.  loteprednol etabonate (LOTEMAX) 0.5 % ophthalmic suspension Administer 1 Drop to both eyes four (4) times daily. No current facility-administered medications for this visit.          Past Surgical History:   Procedure Laterality Date    HX CATARACT REMOVAL      HX HEART CATHETERIZATION  2020    HX HERNIA REPAIR      HX KNEE ARTHROSCOPY      HX REFRACTIVE SURGERY Left 12/2020    HX TURP         Visit Vitals  BP (!) 124/57 (BP 1 Location: Left arm, BP Patient Position: Sitting)   Pulse (!) 55   Temp 98.2 °F (36.8 °C) (Temporal)   Resp 16   Ht 6' (1.829 m)   Wt 96.4 kg (212 lb 9.6 oz)   SpO2 97%   BMI 28.83 kg/m²       Diagnostic Studies:  I have reviewed the relevant tests done on the patient and show as follows  EKG tracings reviewed by me today. EKG Results     None        XR Results (most recent):  Results from Hospital Encounter encounter on 11/25/20   XR CHEST PA LAT    Narrative EXAM: XR CHEST PA LAT    INDICATION: cath, poss PCI    COMPARISON: January 2020. FINDINGS: PA and lateral radiographs of the chest demonstrate clear lungs. Normal cardiac silhouette. Minimal aortic ectasia and atherosclerosis. . Osseous  degenerative changes. No acute bone findings. .       Impression IMPRESSION: No acute findings or interval change     01/27/20   NUCLEAR CARDIAC STRESS TEST 01/27/2020 1/27/2020    Narrative · Baseline ECG: Sinus bradycardia, non-specific ST-T wave abnormalities. · Negative stress test.  · Moderate risk Duke treadmill score. · Gated SPECT: Left ventricular function post-stress was normal.   Calculated ejection fraction is 56%. There is no evidence of transient   ischemic dilation (TID). The TID ratio is 1.05. · Left ventricular perfusion is normal.  · Negative myocardial perfusion imaging. Myocardial perfusion imaging   supports a low risk stress test.        Signed by: Orlando Cope MD        12/01/20   CARDIAC PROCEDURE 12/01/2020 12/1/2020    Narrative · Normal epicardial coronary arteries other than minor luminal   irregularities. · Normal LVEDP at 12 mm. · Procedure done via right femoral artery without any complications using   4 [place] catheters. Risk factor modification for coronary disease and may consider further   testing for atypical anginal chest discomfort.        Signed by: Orlando Cope MD       Mr. Silvana Francisco has a reminder for a \"due or due soon\" health maintenance. I have asked that he contact his primary care provider for follow-up on this health maintenance. Physical Exam   Constitutional: He is oriented to person, place, and time. He appears well-developed and well-nourished. No distress. HENT:   Head: Normocephalic and atraumatic. Mouth/Throat: Normal dentition. Eyes: Right eye exhibits no discharge. Left eye exhibits no discharge. No scleral icterus. Neck: Neck supple. No JVD present. Carotid bruit is not present. No thyromegaly present. Cardiovascular: Normal rate, regular rhythm, S1 normal, S2 normal, normal heart sounds and intact distal pulses. Exam reveals no gallop and no friction rub. No murmur heard. Pulmonary/Chest: Effort normal and breath sounds normal. He has no wheezes. He has no rales. Abdominal: Soft. He exhibits no mass. There is no abdominal tenderness. Musculoskeletal:         General: No edema. Lymphadenopathy:        Right cervical: No superficial cervical adenopathy present. Left cervical: No superficial cervical adenopathy present. Neurological: He is alert and oriented to person, place, and time. Skin: Skin is warm and dry. No rash noted. Psychiatric: He has a normal mood and affect. His behavior is normal.       ASSESSMENT and PLAN    HLD : Results for Sharon Muñoz (MRN [id number]) as of 11/5/2020 08:45   Ref. Range 2/6/2020 09:19   Triglyceride Latest Ref Range: <150 MG/DL 63   Cholesterol, total Latest Ref Range: <200 MG/   HDL Cholesterol Latest Ref Range: 40 - 60 MG/DL 55   CHOL/HDL Ratio Latest Ref Range: 0 - 5.0   2.1   VLDL, calculated Latest Units: MG/DL 12.6   LDL, calculated Latest Ref Range: 0 - 100 MG/DL 49.4       Chest pain has resolved. Cardiac catheterization showed normal coronary arteries. He is given Voltaren gel for pains. I cautioned him for chronic use as it can be associated with increased risk of heart attack and stroke as well as renal issues.   He is very stable from cardiac standpoint and I will see him in future as needed. Follow-up with PCP. Mediterranean diet guidelines given. Diagnoses and all orders for this visit:    1. Atypical angina ([address]    2. Hypercholesteremia        Pertinent laboratory and test data reviewed and discussed with patient. See patient instructions also for other medical advice given    There are no discontinued medications. Follow-up and Dispositions    · Return if symptoms worsen or fail to improve.

## 2021-01-14 NOTE — PROGRESS NOTES
[facility] presents today for   Chief Complaint   Patient presents with   [address]. preferred language for health care discussion is english/other. Personal Protective Equipment:   Personal Protective Equipment was used including: mask-surgical and hands-gloves. Patient was placed on no precaution(s). Patient was masked. Is someone accompanying this pt? No    Is the patient using any DME equipment during OV? No    Depression Screening:  3 most recent PHQ Screens 1/24/2020   Little interest or pleasure in doing things Not at all   Feeling down, depressed, irritable, or hopeless Not at all   Total Score PHQ 2 0       Learning Assessment:  No flowsheet data found. Abuse Screening:  No flowsheet data found. Fall Risk  Fall Risk Assessment, last 12 mths 11/19/2020   Able to walk? Yes   Fall in past 12 months? No       Pt currently taking Anticoagulant therapy? No    Coordination of Care:  1. Have you been to the ER, urgent care clinic since your last visit? Hospitalized since your last visit? No    2. Have you seen or consulted any other health care providers outside of the [address] [place] [address] since your last visit? Include any pap smears or colon screening.  No

## 2021-05-03 ENCOUNTER — TRANSCRIBE ORDER (OUTPATIENT)
Dept: SCHEDULING | Age: 81
End: 2021-05-03

## 2021-05-03 DIAGNOSIS — R91.8 MULTIPLE LUNG NODULES ON CT: Primary | ICD-10-CM

## 2021-05-05 ENCOUNTER — HOSPITAL ENCOUNTER (OUTPATIENT)
Dept: GENERAL RADIOLOGY | Age: 81
Discharge: HOME | End: 2021-05-05
Payer: MEDICARE

## 2021-05-05 ENCOUNTER — TRANSCRIBE ORDER (OUTPATIENT)
Dept: REGISTRATION | Age: 81
End: 2021-05-05

## 2021-05-05 DIAGNOSIS — M79.671 RIGHT FOOT PAIN: ICD-10-CM

## 2021-05-05 DIAGNOSIS — M79.671 RIGHT FOOT PAIN: Primary | ICD-10-CM

## 2021-05-05 DIAGNOSIS — M25.571 RIGHT ANKLE PAIN, UNSPECIFIED CHRONICITY: ICD-10-CM

## 2021-05-05 PROCEDURE — 73610 X-RAY EXAM OF ANKLE: CPT

## 2021-05-05 PROCEDURE — 73630 X-RAY EXAM OF FOOT: CPT

## 2021-05-07 ENCOUNTER — TRANSCRIBE ORDER (OUTPATIENT)
Dept: SCHEDULING | Age: 81
End: 2021-05-07

## 2021-05-07 DIAGNOSIS — R10.30 GROIN PAIN: ICD-10-CM

## 2021-05-07 DIAGNOSIS — N40.1 ENLARGED PROSTATE WITH LOWER URINARY TRACT SYMPTOMS (LUTS): Primary | ICD-10-CM

## 2021-05-07 DIAGNOSIS — N44.2 TESTICULAR CYST: ICD-10-CM

## 2021-05-07 DIAGNOSIS — Z87.19 HISTORY OF INGUINAL HERNIA REPAIR: ICD-10-CM

## 2021-05-07 DIAGNOSIS — Z98.890 HISTORY OF INGUINAL HERNIA REPAIR: ICD-10-CM

## 2021-05-11 ENCOUNTER — HOSPITAL ENCOUNTER (OUTPATIENT)
Dept: CT IMAGING | Age: 81
Discharge: HOME | End: 2021-05-11
Attending: FAMILY MEDICINE
Payer: MEDICARE

## 2021-05-11 DIAGNOSIS — R91.8 MULTIPLE LUNG NODULES ON CT: ICD-10-CM

## 2021-05-11 PROCEDURE — 71260 CT THORAX DX C+: CPT

## 2021-05-11 PROCEDURE — 74011000636 HC RX REV CODE- 636: Performed by: FAMILY MEDICINE

## 2021-05-11 PROCEDURE — 82565 ASSAY OF CREATININE: CPT

## 2021-05-11 RX ADMIN — IOPAMIDOL 80 ML: 612 INJECTION, SOLUTION INTRAVENOUS at 07:50

## 2021-05-18 ENCOUNTER — HOSPITAL ENCOUNTER (OUTPATIENT)
Age: 81
Discharge: HOME | End: 2021-05-18
Attending: FAMILY MEDICINE
Payer: MEDICARE

## 2021-05-18 VITALS — WEIGHT: 212 LBS | BODY MASS INDEX: 28.75 KG/M2

## 2021-05-18 DIAGNOSIS — Z87.19 HISTORY OF INGUINAL HERNIA REPAIR: ICD-10-CM

## 2021-05-18 DIAGNOSIS — Z98.890 HISTORY OF INGUINAL HERNIA REPAIR: ICD-10-CM

## 2021-05-18 DIAGNOSIS — N40.1 ENLARGED PROSTATE WITH LOWER URINARY TRACT SYMPTOMS (LUTS): ICD-10-CM

## 2021-05-18 DIAGNOSIS — N44.2 TESTICULAR CYST: ICD-10-CM

## 2021-05-18 DIAGNOSIS — R10.30 GROIN PAIN: ICD-10-CM

## 2021-05-18 PROCEDURE — 82565 ASSAY OF CREATININE: CPT

## 2021-05-18 PROCEDURE — A9577 INJ MULTIHANCE: HCPCS | Performed by: FAMILY MEDICINE

## 2021-05-18 PROCEDURE — 74011250636 HC RX REV CODE- 250/636: Performed by: FAMILY MEDICINE

## 2021-05-18 PROCEDURE — 72197 MRI PELVIS W/O & W/DYE: CPT

## 2021-05-18 RX ADMIN — GADOBENATE DIMEGLUMINE 20 ML: 529 INJECTION, SOLUTION INTRAVENOUS at 19:17

## 2021-05-19 LAB — CREAT UR-MCNC: 1.1 MG/DL (ref 0.6–1.3)

## 2021-05-21 LAB — CREAT UR-MCNC: 1.2 MG/DL (ref 0.6–1.3)

## 2021-10-13 NOTE — PROGRESS NOTES
Hpi Title: Evaluation of Skin Lesions PT DAILY TREATMENT NOTE/LUMBAR EVAL 10-18    Patient Name: Mary Beth Burton. Date:2019  : 1940  [x]  Patient  Verified  Payor: [place] MEDICARE / Plan: [place] MEDICARE PART A & B / Product Type: Medicare /    In time:3:12  Out time:4:00  Total Treatment Time (min): 48  Visit #: 1 of 12    Medicare/BCBS Only   Total Timed Codes (min):  25 1:1 Treatment Time:  48     Treatment Area: Sciatica, right side [M54.31]  SUBJECTIVE  Pain Level (0-10 scale): 1/10  []constant []intermittent 1/10proving []worsening []no change since onset    Any medication changes, allergies to medications, adverse drug reactions, diagnosis change, or new procedure performed?: [x] No    [] Yes (see summary sheet for update)  Subjective functional status/changes:       Pt is a 68 yo who complains of right lumbar pain and pain extending into right LE to mid-calf beginning in 2019 after getting a new car. Pain in right LE is described as throbbing and aching. Subjective reports of pain increased with sitting and relieved with standing.         Barriers: []pain []financial []time []transportation []other  Motivation: high  Substance use: []Alcohol []Tobacco []other:   FABQ Score: []low []elevate  Cognition: A & O x 4    Other:    OBJECTIVE/EXAMINATION    23 min [x]Eval                  []Re-Eval     12 min Therapeutic Activity:  []  See flow sheet :   Rationale: Educated patient regarding findings of evaluation, anatomy of spine using spine model, sleeping posture with neutral spine and pillow between knees, neutral spine in sitting, new therex technique and purpose, purpose of HEP (pt performed 1 rep of each intervention to ensure correct form), heat use 10-15 minutes, self DTM/TPR to left glute med with lacrosse ball, purpose of therapy, and therapy plan in order to ensure pt understanding/compliance with therapy.       13 min Manual Therapy:  DTM right glute med with lacrosse ball, TPR right glute med and vastus lateralis How Severe Are Your Spot(S)?: mild Rationale: decrease pain, increase ROM, increase tissue extensibility and decrease trigger points to improve sitting tolerance for increased ease of driving. With   [] TE   [] TA   [] neuro   [] other: Patient Education: [x] Review HEP    [] Progressed/Changed HEP based on:   [] positioning   [] body mechanics   [] transfers   [] heat/ice application    [] other:      Other Objective/Functional Measures:     /68 taken manually prior to therapy     Physical Therapy Evaluation - Lumbar Spine ([facility])    SUBJECTIVE     General Health:  Red Flags Indicated? [] Yes    [] No  [] Yes [x] No Recent weight change (If yes, due to dieting?  [] Yes  [] No)   [] Yes [x] No Weakness in legs during walking  [] Yes [x] No Unremitting pain at night  [] Yes [x] No Abdominal pain or problems  [] Yes [x] No Rectal bleeding  [x] Yes [] No Feet more cold in cold weather  [] Yes [x] No Blood or pain with urination - 8 months ago but was treated and none since  [] Yes [x] No Dysfunction of bowel or bladder  [] Yes [x] No Recent illness within past 3 weeks (i.e, cold, flu)  [] Yes [x] No Numbness/tingling in buttock/genitalia region    Past History/Treatments:     Diagnostic Tests: [] Lab work [] X-rays    [] CT [] MRI     [] Other:  Results: No recent imaging       OBJECTIVE    Gait:  [x] Normal     [] Abnormal:    Active Movements: [] N/A   [] Too acute   [] Other:  ROM % AROM % PROM Comments:pain, area   Forward flexion 40-60 75  Pain during motion into flexion    Extension 20-30 75     SB right 20-30 75     SB left 20-30 75       Repeated Movements   Effects on present pain: produces (AR), abolishes (A), increases (incr), decreases (decr), centralizes (C), peripheral (PH), no effect (NE)   Pre-Test Sx Flexion Repeated Flexion Extension Repeated Extension Repeated SBL Repeated SBR   Sitting          Standing  Incr Incr NE NE NE NE   Lying      N/A N/A     Neuro Screen [] WNL  Dermatomes: intact to light touch BLE    Dural Mobility:          Supine: [x] R    [x] L    [] +    [x] -  @ (degrees):     Palpation  [] Min  [x] Mod  [] Severe    Location: TTP and trigger points right glute med    Mild TTP right vastus lateralis   No TTP bony prominences or musculature of lumbar region     Palpation of trigger points in right glute med referred pain into right LE and reproduced same sx of right LE pain but with reduced intensity per subjective reports     Strength   L(0-5) R (0-5) N/T   Hip Flexion (L1,2) 4+/5 4+/5 []   Knee Extension (L3,4) 4+/5 4+/5 []   Ankle Dorsiflexion (L4) 4+/5 4+/5 []   Great Toe Extension (L5)   []   Ankle Plantarflexion (S1)   []   Knee Flexion (S1,2) 4+/5 4+/5 []   Hip IR 4+/5 4+/5 []   Hip ER 4+/5 4+/5 []   Hip Abduction  4+ 4 []   Hip Adduction 4 4 []   Hip Extension  4+ 4 []      []     Special Tests  Lumbar:                Lumbar Distraction:   [] Pos  [x] Neg  Sacroilliac:      Compression: [] +    [x] -     Gapping:  [] +    [x] -           Hip: Doni:  [x] R    [x] L    [x] +    [x] -     Scour:  [x] R    [x] L    [x] +    [x] -     Piriformis: [x] R    [x] L    [x] +    [x] -          Other tests/comments:  Mild flexibility deficits evident B hamstrings and right > left piriformis  Reduced pain following manual      Pain Level (0-10 scale) post treatment: 0/10    ASSESSMENT/Changes in Function: See POC    Patient will continue to benefit from skilled PT services to modify and progress therapeutic interventions, address functional mobility deficits, address ROM deficits, address strength deficits, analyze and address soft tissue restrictions, analyze and cue movement patterns, analyze and modify body mechanics/ergonomics, assess and modify postural abnormalities and instruct in home and community integration to attain remaining goals.      [x]  See Plan of Care  []  See progress note/recertification  []  See Discharge Summary         Progress towards goals / Updated goals:  See POC    PLAN  [x] Have Your Spot(S) Been Treated In The Past?: has not been treated Upgrade activities as tolerated     []  Continue plan of care  [x]  Update interventions per flow sheet       []  Discharge due to:_  []  Other:_      Samara Mejia, PT 11/4/2019  3:14 PM

## 2022-03-02 PROBLEM — H16.143 PUNCTATE KERATITIS, BILATERAL: Status: ACTIVE | Noted: 2022-03-02

## 2022-03-02 PROBLEM — H04.122 DRY EYE SYNDROME OF LEFT LACRIMAL GLAND: Status: ACTIVE | Noted: 2022-03-02

## 2022-03-02 PROBLEM — H04.121 DRY EYE SYNDROME OF RIGHT LACRIMAL GLAND: Status: ACTIVE | Noted: 2022-03-02

## 2022-08-03 ENCOUNTER — HOSPITAL ENCOUNTER (OUTPATIENT)
Dept: PHYSICAL THERAPY | Age: 82
Discharge: HOME | End: 2022-08-03
Payer: MEDICARE

## 2022-08-03 PROCEDURE — 97530 THERAPEUTIC ACTIVITIES: CPT

## 2022-08-03 PROCEDURE — 97162 PT EVAL MOD COMPLEX 30 MIN: CPT

## 2022-08-03 PROCEDURE — 97110 THERAPEUTIC EXERCISES: CPT

## 2022-08-03 NOTE — PROGRESS NOTES
[facility] [employer] [facility]  [address] [facility]  [phone] fax    Plan of Care/ Statement of Necessity for Physical Therapy Services    Patient name: Carolin Gaines Start of Care: 8/3/2022   Referral source: Karthik Epstein MD : 1940    Medical Diagnosis: Right shoulder pain [M25.511]  Payor: [place] MEDICARE / Plan: VA MEDICARE PART A & B / Product Type: Medicare /  Onset Date:chronic    Treatment Diagnosis: Right shoulder pain   Prior Hospitalization: see medical history Provider#: [id number]   Medications: Verified on Patient summary List    Comorbidities: arthritis, thyroid problems   Prior Level of Function: enjoys working out (5-6 days/week)      The Plan of Care and following information is based on the information from the initial evaluation. Assessment/ key information: Patient is an 41-year-old right-handed male who presents to therapy with chief complaint of right shoulder pain of insidious onset. Patient reports pain interferes with exercises; he has not been able to perform normal workout routine. Symptoms localized to anterolateral shoulder and described as sharp. Symptoms aggravated with elevating right arm; symptoms reduced with rest and use of gel/medication. Exam reveals normal cervical screen. Patient with min decreased right shoulder AROM into elevation vs left. Patient with TTP to area just inferior to [facility] joint on right. Speed's and empty can tests negative; Neer's and AC joint tests positive. Patient would benefit from skilled outpatient PT to address above mentioned deficits to return to prior level of function, increase independence with ADLs, and improve overall quality of life.     Evaluation Complexity History MEDIUM  Complexity : 1-2 comorbidities / personal factors will impact the outcome/ POC ; Examination HIGH Complexity : 4+ Standardized tests and measures addressing body structure, function, activity limitation and / or participation in recreation  ;Presentation MEDIUM Complexity : Evolving with changing characteristics  ; Clinical Decision Making MEDIUM Complexity : FOTO score of 26-74  Overall Complexity Rating: MEDIUM  Problem List: pain affecting function, decrease ROM, decrease strength, impaired gait/ balance, decrease ADL/ functional abilitiies, decrease activity tolerance, decrease flexibility/ joint mobility, and decrease transfer abilities   Treatment Plan may include any combination of the following: Therapeutic exercise, Therapeutic activities, Neuromuscular re-education, Physical agent/modality, Gait/balance training, Manual therapy, Patient education, Self Care training, Functional mobility training, Home safety training, and Stair training  Patient / Family readiness to learn indicated by: asking questions, trying to perform skills, and interest  Persons(s) to be included in education: patient (P)  Barriers to Learning/Limitations: None  Patient Goal (s): to get back to doing exercises  Patient Self Reported Health Status: good  Rehabilitation Potential: good    Short Term Goals: To be accomplished in 1 weeks:  1. Patient will report compliance with initial HEP to optimize therapy outcomes. Status at eval: established    Long Term Goals: To be accomplished in 4 weeks:  1. Patient will improve FOTO score to at least 71/100 points in order to demonstrate functional improvement. Status at eval: 66/100               2. Patient will report no more than \"no difficulty\" with \"reach[ing] a shelf that is at shoulder height\" with FOTO in order to demonstrate improved tolerance to daily tasks. Status at eval: \"some difficulty\"   3. Patient will improve right shoulder abd AROM to at least 165 deg with only min pain in order to perform recreational tasks with increased ease. Status at eval: 156 deg, max pain   4.  Patient will report no greater than 5/10 pain in right shoulder in order to perform ADLs with increased ease.    Status at [facility]: 9/10    Frequency / Duration: Patient to be seen 2-3 times per week for 4 weeks. Patient/ Caregiver education and instruction: Diagnosis, prognosis, self care, activity modification, and exercises   [x]  Plan of care has been reviewed with PTA    Certification Period: 8/3/22-9/1/22  Araubrey Severino, PT 8/3/2022 8:44 AM    ________________________________________________________________________    I certify that the above Therapy Services are being furnished while the patient is under my care. I agree with the treatment plan and certify that this therapy is necessary.     [de-identified] Signature:____________Date:_________TIME:________     Denis Plunkett MD  ** Signature, Date and Time must be completed for valid certification **    Please sign and return to [facility]  [address] [place] [address]  [phone] fax

## 2022-08-03 NOTE — PROGRESS NOTES
PT DAILY TREATMENT NOTE/SHOULDER EVAL     Patient Name: Edvin Cohn  Date:8/3/2022  : 1940  [x]  Patient  Verified  Payor: [place] MEDICARE / Plan: [place] MEDICARE PART A & B / Product Type: Medicare /    In time:12:49P  Out time:1:31P  Total Treatment Time (min): 42  Visit #: 1 of 12    Medicare/BCBS Only   Total Timed Codes (min):  25 1:1 Treatment Time:  42       Treatment Area: Right shoulder pain [M25.511]    SUBJECTIVE  Pain Level (0-10 scale): 1-2/10 (at worst: 9/10, at best: 0/10)  []constant []intermittent []improving []worsening []no change since onset    Any medication changes, allergies to medications, adverse drug reactions, diagnosis change, or new procedure performed?: [x] No    [] Yes (see summary sheet for update)  Subjective functional status/changes:       Comorbidities: arthritis, thyroid problems   Prior Level of Function: enjoys working out (5-6 days/week)      The Plan of Care and following information is based on the information from the initial evaluation. Assessment/ key information: Patient is an 81-year-old right-handed male who presents to therapy with chief complaint of right shoulder pain of insidious onset. Patient reports pain interferes with exercises; he has not been able to perform normal workout routine. Symptoms localized to anterolateral shoulder and described as sharp. Symptoms aggravated with elevating right arm; symptoms reduced with rest and use of gel/medication. Exam reveals normal cervical screen. Patient with min decreased right shoulder AROM into elevation vs left. Patient with T[facility] to [facility] just [facility] on right. Speed's and empty can tests negative; Neer's and AC joint tests positive. Patient would benefit from skilled outpatient PT to address above mentioned deficits to return to prior level of function, increase independence with ADLs, and improve overall quality of life.     17 min [x]Eval                  []Re-Eval       10 min Therapeutic Exercise:  [] See flow sheet : HEP review   Rationale: increase ROM, increase strength, and increase proprioception to improve the patients ability to perform ADLs with increased ease    15 min Therapeutic Activity:  []  See flow sheet : patient education   Rationale: increase ROM, increase strength, and increase proprioception  to improve the patients ability to perform ADLs with increased ease          With   [] TE   [x] TA   [] neuro   [] other: Patient Education: [x] Review HEP    [] Progressed/Changed HEP based on:   [] positioning   [] body mechanics   [] transfers   [] heat/ice application    [x] other: diagnosis, prognosis, POC, purpose and importance of therapy; anatomy and physiology as it relates to current condition; examination findings      Other Objective/Functional Measures:     Physical Therapy Evaluation - Shoulder    Posture: [] Poor    [x] Fair    [] Good    Describe:    ROM:  [] Unable to assess at this time    Cervical screen: WNL                                           AROM                                                              PROM   Left Right  Left Right   Flexion 151 148 Flexion     Extension   Extension     Scaption/ 156 Scaptin/ABD     ER @ 0 Degrees   ER @ 0 Degrees     ER @ 90 Degrees   ER @ 90 Degrees     IR @ 90 Degrees   IR @ 90 Degrees       End Feel / Painful Arc:    Strength:   [] Unable to assess at this time                                                                            L (1-5) R (1-5) Pain   Flexors 5 5 [x] Yes   [] No   Abductors 5 5 [] Yes   [] No   External Rotators 4+ 4 [] Yes   [] No   Internal Rotators 4+ 5 [] Yes   [] No   Supraspinatus   [] Yes   [] No   Serratus Anterior   [] Yes   [] No   Lower Trapezius   [] Yes   [] No   Elbow Flexion 4+ 5 [] Yes   [] No   Elbow Extension 5 5 [] Yes   [] No       Scapulohumoral Control / Rhythm:  Able to eccentrically lower with good control?  Left: [x] Yes   [] No     Right: [x] Yes   [] No    Accessory Motions:    Palpation TTP just inferior to [facility] joint   [] Min  [] Mod  [] Severe    Location:  [] Min  [] Mod  [] Severe    Location:  [] Min  [] Mod  [] Severe    Location:    Optional Tests:    Sensation Left Right Reflexes Left Right   Biceps (C5)   Biceps (C5)     Sheehan Radial(C6-7)   Brachioradialis (C6)     Sheehan Ulnar(C8-T1)   Triceps (C7)       Adson's Test  [] Pos   [] Neg Yergason's Test [] Pos   [] Neg  Talisha's Test  [] Pos   [] Neg Rafaela's Sign [] Pos   [] Neg  Neer's Test  [x] Pos   [] Neg Clunk Test  [] Pos   [] Neg  Hawkin's Test  [] Pos   [x] Neg AC Joint  [x] Pos   [] Neg  Speed's Test  [] Pos   [x] Neg SC Joint  [] Pos   [] Neg  Empty Can  [] Pos   [x] Neg Pectoral Tightness [] Pos   [] Neg  Anterior Apprehension [] Pos   [] Neg   Posterior Apprehension [] Pos   [] Neg      Other Tests / Comments:       Pain Level (0-10 scale) post treatment: 1-2/10    ASSESSMENT/Changes in Function: See POC    Patient will continue to benefit from skilled PT services to modify and progress therapeutic interventions, address functional mobility deficits, address ROM deficits, address strength deficits, analyze and address soft tissue restrictions, analyze and cue movement patterns, analyze and modify body mechanics/ergonomics, assess and modify postural abnormalities, and instruct in home and community integration to attain remaining goals.      [x]  See Plan of Care  []  See progress note/recertification  []  See Discharge Summary         Progress towards goals / Updated goals:  See POC    PLAN  [x]  Upgrade activities as tolerated     [x]  Continue plan of care  []  Update interventions per flow sheet       []  Discharge due to:_  []  Other:_      Brook Epley, PT 8/3/2022  8:42 AM

## 2022-08-10 ENCOUNTER — HOSPITAL ENCOUNTER (OUTPATIENT)
Dept: PHYSICAL THERAPY | Age: 82
Discharge: HOME | End: 2022-08-10
Payer: MEDICARE

## 2022-08-10 PROCEDURE — 97530 THERAPEUTIC ACTIVITIES: CPT

## 2022-08-10 PROCEDURE — 97112 NEUROMUSCULAR REEDUCATION: CPT

## 2022-08-10 PROCEDURE — 97110 THERAPEUTIC EXERCISES: CPT

## 2022-08-10 NOTE — PROGRESS NOTES
PT DAILY TREATMENT NOTE     Patient Name: Carolann Edwards  Date:8/10/2022  : 1940  [x]  Patient  Verified  Payor: VA MEDICARE / Plan: VA MEDICARE PART A & B / Product Type: Medicare /    In time:300  Out time:345  Total Treatment Time (min): 45  Visit #: 2 of 12    Medicare/BCBS Only   Total Timed Codes (min):  45 1:1 Treatment Time:  45       Treatment Area: Right shoulder pain [M25.511]    SUBJECTIVE  Pain Level (0-10 scale): 0 at rest, 4-8 with activity  Any medication changes, allergies to medications, adverse drug reactions, diagnosis change, or new procedure performed?: [x] No    [] Yes (see summary sheet for update)  Subjective functional status/changes:   [] No changes reported  Patient reports that he experiences no pain at rest, it increases when he moves his arm overhead. OBJECTIVE        15 min Therapeutic Exercise:  [x] See flow sheet : initiated POC   Rationale: increase ROM and increase strength to improve the patients ability to complete ADLs with ease. 15 min Therapeutic Activity:  [x]  See flow sheet : functional reaching   Rationale: increase ROM, increase strength, improve coordination, and increase proprioception  to improve the patients ability to complete functional activities with ease. 15 min Neuromuscular Re-education:  [x]  See flow sheet : scapular re-education, including VC to decrease trunk leaning and increase scapular retractor and core engagement   Rationale: increase ROM, increase strength, improve coordination, improve balance, and increase proprioception  to improve the patients ability to improve biomechanics and scapulohumeral rhythm for ease of ADL completion.            With   [x] TE   [] TA   [] neuro   [] other: Patient Education: [x] Review HEP    [] Progressed/Changed HEP based on:   [] positioning   [] body mechanics   [] transfers   [] heat/ice application    [x] other: increase adherence to HEP     Other Objective/Functional Measures: Noted increased forward head posture throughout activities. Pain Level (0-10 scale) post treatment: 0 \"tingling\"    ASSESSMENT/Changes in Function: Patient tolerated initial visit after  evaluation well, reporting no adverse responses throughout activities. Exercises focused on increased scapular strength, mobility, and proprioceptive awareness for improved scapulohumeral rhythm/completion of ADLs. Continue to progress POC to return pt. To PLOF. Patient will continue to benefit from skilled PT services to modify and progress therapeutic interventions, address functional mobility deficits, address ROM deficits, address strength deficits, analyze and address soft tissue restrictions, analyze and cue movement patterns, analyze and modify body mechanics/ergonomics, assess and modify postural abnormalities, and instruct in home and community integration to attain remaining goals. [x]  See Plan of Care  []  See progress note/recertification  []  See Discharge Summary         Progress towards goals / Updated goals:  Short Term Goals: To be accomplished in 1 weeks:  1. Patient will report compliance with initial HEP to optimize therapy outcomes. Status at eval: established  Progressing, Patient reports inconsistent compliance with HEP. 8/10/22     Long Term Goals: To be accomplished in 4 weeks:  1. Patient will improve FOTO score to at least 71/100 points in order to demonstrate functional improvement. Status at eval: 66/100               2. Patient will report no more than \"no difficulty\" with \"reach[ing] a shelf that is at shoulder height\" with FOTO in order to demonstrate improved tolerance to daily tasks. Status at eval: \"some difficulty\"              3. Patient will improve right shoulder abd AROM to at least 165 deg with only min pain in order to perform recreational tasks with increased ease.                           Status at eval: 156 deg, max pain              4. Patient will report no greater than 5/10 pain in right shoulder in order to perform ADLs with increased ease.                           Status at eval: 9/10    PLAN  [x]  Upgrade activities as tolerated     []  Continue plan of care  []  Update interventions per flow sheet       []  Discharge due to:_  []  Other:_      Jacqueline Pica, PTA 8/10/2022  11:04 AM    Future Appointments   Date Time Provider Narciso Mame   8/10/2022  3:00 PM Thee Bennett [place] [facility]   8/12/2022  2:15 PM Laura Santiago, PTA [facility]   8/16/2022  2:15 PM Thee Bennett, PTA [facility]   8/18/2022  1:30 PM Ghada Prior, PTA [facility]   8/22/2022  1:30 PM Kevin Al, PT [facility]   8/25/2022  3:00 PM Ghada Prior, PTA [facility]   8/30/2022  2:15 PM Ghada Prior, PTA [facility]   9/1/2022  2:15 PM Ghada Prior, PTA [facility]   2/22/2023 10:50 AM Jose Alfredo Rubio   3/1/2023 10:30 AM Cheo De La Cruz MD [id number] [facility]

## 2022-08-12 ENCOUNTER — HOSPITAL ENCOUNTER (OUTPATIENT)
Dept: PHYSICAL THERAPY | Age: 82
Discharge: HOME | End: 2022-08-12
Payer: MEDICARE

## 2022-08-12 PROCEDURE — 97110 THERAPEUTIC EXERCISES: CPT

## 2022-08-12 PROCEDURE — 97112 NEUROMUSCULAR REEDUCATION: CPT

## 2022-08-12 PROCEDURE — 97530 THERAPEUTIC ACTIVITIES: CPT

## 2022-08-12 NOTE — PROGRESS NOTES
PT DAILY TREATMENT NOTE     Patient Name: Lilliam Pelletier  Date:2022  : 1940  [x]  Patient  Verified  Payor: VA MEDICARE / Plan: [place] MEDICARE PART A & B / Product Type: Medicare /    In time: 2:15  Out time: 3:12  Total Treatment Time (min): 62  Visit #: 3 of 12    Medicare/BCBS Only   Total Timed Codes (min):  47 1:1 Treatment Time:  47       Treatment Area: Right shoulder pain [M25.511]    SUBJECTIVE  Pain Level (0-10 scale): 2/10  Any medication changes, allergies to medications, adverse drug reactions, diagnosis change, or new procedure performed?: [x] No    [] Yes (see summary sheet for update)  Subjective functional status/changes:   [] No changes reported  Patient reports no changes. HEP is going okay. OBJECTIVE    Modality rationale: decrease inflammation and decrease pain to improve the patients ability to perform functional reaching tasks.     Min Type Additional Details    [] Estim: []Att   []Unatt  []TENS instruct                 []IFC  []Premod []NMES                       []Other:  []w/US   []w/ice   []w/heat  Position:  Location:    []  Traction: [] Cervical       []Lumbar                       [] Prone          []Supine                       []Intermittent   []Continuous Lbs:  [] before manual  [] after manual    []  Ultrasound: []Continuous   [] Pulsed                           []1MHz   []3MHz Location:  W/cm2:    []  Iontophoresis with dexamethasone         Location: [] Take home patch   [] In clinic   10 [x]  Ice     []  heat  []  Ice massage Position: seated  Location: right shoulder    []  Vasopneumatic Device:  If using vaso (only need to measure limb vaso being performed on)      pre-treatment girth :       post-treatment girth :       measured at (landmark location)  Pressure: [] lo [] med [] hi   Temp: [] lo [] med [] hi   [x] Skin assessment post-treatment:  [x]intact []redness- no adverse reaction       []redness - adverse reaction:      17 min Therapeutic Exercise:  [x] See flow sheet :    Rationale: increase ROM and increase strength to improve the patients ability to complete ADLs with ease. 15 min Therapeutic Activity:  [x]  See flow sheet :    Rationale: increase ROM, increase strength, improve coordination, and increase proprioception  to improve the patients ability to complete functional activities with ease. 15 min Neuromuscular Re-education:  [x]  See flow sheet : scapular re-education, including VC to decrease trunk leaning and increase scapular retractor and core engagement   Rationale: increase ROM, increase strength, improve coordination, improve balance, and increase proprioception  to improve the patients ability to improve biomechanics and scapulohumeral rhythm for ease of ADL completion. With   [x] TE   [] TA   [] neuro   [] other: Patient Education: [x] Review HEP    [] Progressed/Changed HEP based on:   [] positioning   [] body mechanics   [] transfers   [] heat/ice application    [x] other: importance of consistent compliance with HEP     Other Objective/Functional Measures:   FHRS requiring moderate cuing for neutral c/s throughout session  Cuing to engage TA and prevent knees locking with Donnalee Cydney  Initiated shoulder extension and prone scap squeeze     Pain Level (0-10 scale) post treatment: 2/10    ASSESSMENT/Changes in Function:   Patient tolerated addition of new therex well. Minimal increase in pain which decreased after use of CP. Moderate cuing for TA and prevent compensatory strategies. Continues to educate patient of importance of compliance with HEP to maintain progress. Continue education on posture as patient demonstrates FHRS.      Patient will continue to benefit from skilled PT services to modify and progress therapeutic interventions, address functional mobility deficits, address ROM deficits, address strength deficits, analyze and address soft tissue restrictions, analyze and cue movement patterns, analyze and modify body mechanics/ergonomics, assess and modify postural abnormalities, and instruct in home and community integration to attain remaining goals. [x]  See Plan of Care  []  See progress note/recertification  []  See Discharge Summary         Progress towards goals / Updated goals:  Short Term Goals: To be accomplished in 1 weeks:  1. Patient will report compliance with initial HEP to optimize therapy outcomes. Status at eval: established  Progressing, Patient reports inconsistent compliance with HEP. 8/10/22     Long Term Goals: To be accomplished in 4 weeks:  1. Patient will improve FOTO score to at least 71/100 points in order to demonstrate functional improvement. Status at eval: 66/100               2. Patient will report no more than \"no difficulty\" with \"reach[ing] a shelf that is at shoulder height\" with FOTO in order to demonstrate improved tolerance to daily tasks. Status at eval: \"some difficulty\"              3. Patient will improve right shoulder abd AROM to at least 165 deg with only min pain in order to perform recreational tasks with increased ease. Status at eval: 156 deg, max pain              4. Patient will report no greater than 5/10 pain in right shoulder in order to perform ADLs with increased ease.                           Status at eval: 9/10    PLAN  [x]  Upgrade activities as tolerated     []  Continue plan of care  []  Update interventions per flow sheet       []  Discharge due to:_  [x]  Other: plan to add cervical retraction NV      Alonzo Amaro, PTA 8/12/2022  3:12 PM    Future Appointments   Date Time Provider Narciso Vilchis   8/12/2022  2:15 PM Taras Villanueva, PTA [facility]   8/16/2022  2:15 PM Josue Parker, PTA [facility]   8/18/2022  1:30 PM Liborio Brown, PTA [facility]   8/22/2022  1:30 PM Kevin Nguyen, PT [facility]   8/25/2022  3:00 PM Liborio Brown, PTA [facility]   8/30/2022  2:15 PM Na Kyle, PTA [facility]   9/1/2022  2:15 PM Na Kyle, PTA [facility]   2/22/2023 10:50 AM [facility]   3/1/2023 10:30 AM MD Madhuri Coughlin

## 2022-08-16 ENCOUNTER — APPOINTMENT (OUTPATIENT)
Dept: PHYSICAL THERAPY | Age: 82
End: 2022-08-16
Payer: MEDICARE

## 2022-08-18 ENCOUNTER — APPOINTMENT (OUTPATIENT)
Dept: PHYSICAL THERAPY | Age: 82
End: 2022-08-18
Payer: MEDICARE

## 2022-08-22 ENCOUNTER — APPOINTMENT (OUTPATIENT)
Dept: PHYSICAL THERAPY | Age: 82
End: 2022-08-22
Payer: MEDICARE

## 2022-08-25 ENCOUNTER — APPOINTMENT (OUTPATIENT)
Dept: PHYSICAL THERAPY | Age: 82
End: 2022-08-25
Payer: MEDICARE

## 2022-08-30 ENCOUNTER — APPOINTMENT (OUTPATIENT)
Dept: PHYSICAL THERAPY | Age: 82
End: 2022-08-30
Payer: MEDICARE

## 2022-09-01 ENCOUNTER — APPOINTMENT (OUTPATIENT)
Dept: PHYSICAL THERAPY | Age: 82
End: 2022-09-01

## 2024-05-18 NOTE — PROGRESS NOTES
2/11/2019 2:47 PM 
 
SSN: [id number] Subjective:   69-year-old male who I saw on December 14 for evaluation of possible obstructive sleep apnea. He has had symptoms of sleepiness during the day for many years, reporting that he could sleep anywhere. He has fluctuating bedtimes and reported sleep fragmentation. Given the symptoms and a metabolic syndrome, we did an overnight sleep study he had on January 21, showing an overall AHI of 3.6 which increased during REM sleep to 8 (30 minutes of total REM sleep) with desaturations down to 84%. He had a total sleep time of 3.6 hours. He continues to have symptoms of falling asleep anywhere anytime he sits down. Social History Socioeconomic History  Marital status:  Spouse name: Not on file  Number of children: Not on file  Years of education: Not on file  Highest education level: Not on file Social Needs  Financial resource strain: Not on file  Food insecurity - worry: Not on file  Food insecurity - inability: Not on file  Transportation needs - medical: Not on file  Transportation needs - non-medical: Not on file Occupational History  Not on file Tobacco Use  Smoking status: Former Smoker  Smokeless tobacco: Never Used Substance and Sexual Activity  Alcohol use: Yes  Drug use: Not on file  Sexual activity: Not on file Other Topics Concern  Not on file Social History Narrative  Not on file Family History Problem Relation Age of Onset  Breast Cancer Mother  Cancer Father  Cancer Sister   
     hodgkins Current Outpatient Medications Medication Sig Dispense Refill  coenzyme q10 300 mg cap Take  by mouth.  Saccharomyces boulardii (FLORASTOR) 250 mg capsule Take  by mouth.     
 cyclobenzaprine (FLEXERIL) 5 mg tablet TAKE ONE-HALF TABLET BY MOUTH EVERY DAY AT BEDTIME AS NEEDED FOR PAIN  1  
  docusate sodium 100 mg tab Take  by mouth.  dorzolamide (TRUSOPT) 2 % ophthalmic solution INSTILL 1 DROP INTO BOTH EYES TWICE A DAY  2  
 hydroCHLOROthiazide (HYDRODIURIL) 25 mg tablet TAKE ONE TABLET BY MOUTH ONE TIME DAILY  1  
 sildenafil, antihypertensive, (REVATIO) 20 mg tablet TAKE 1 TAB BY MOUTH AS NEEDED. TAKE 1-5 TABS AS DIRECTED INDICATIONS: [facility] 40 Tab 0  
 KLOR-CON M10 10 mEq tablet TAKE 1 TABLET BY MOUTH EVERY DAY  3  
 tamsulosin (FLOMAX) 0.4 mg capsule Take 1 Cap by mouth daily. 90 Cap 3  
 tadalafil (CIALIS) 5 mg tablet Take 1 Tab by mouth daily. Indications: Erectile Dysfunction, SYMPTOMATIC BENIGN PROSTATIC HYPERPLASIA 30 Tab 6  LUMIGAN 0.01 % ophthalmic drops  COMBIGAN 0.2-0.5 % drop ophthalmic solution  AZOPT 1 % ophthalmic suspension  simvastatin (ZOCOR) 20 mg tablet  Cholecalciferol, Vitamin D3, (VITAMIN D3) 1,000 unit cap Take  by mouth.  b complex vitamins (B COMPLEX 1) tablet Take 1 Tab by mouth daily.  multivitamin (ONE A DAY) tablet Take 1 Tab by mouth daily.  omega-3 fatty acids-vitamin e (FISH OIL) 1,000 mg cap Take 1 Cap by mouth.  GLUCOSAM/MSM/CHOND/FRC902/HYAL (GLUCOSAMINE-CHONDROITIN-MSM PO) Take  by mouth.  Flaxseed Oil Oil by Does Not Apply route.  zinc 50 mg capsule Take  by mouth.  melatonin 3 mg tablet Take  by mouth.  SODIUM CHLORIDE by Does Not Apply route.  cycloSPORINE (RESTASIS) 0.05 % ophthalmic emulsion Administer 1 Drop to both eyes two (2) times a day.  aspirin 81 mg tablet Take 81 mg by mouth.  loteprednol etabonate (LOTEMAX) 0.5 % ophthalmic suspension Administer 1 Drop to both eyes four (4) times daily. Past Medical History:  
Diagnosis Date  Arthritis  BPH (benign prostatic hyperplasia)  Hemospermia  High blood pressure  High cholesterol  Impotence of organic origin  Inguinal hernia  Insomnia  Rt groin pain  Urge incontinence Past Surgical History:  
Procedure Laterality Date  HX CATARACT REMOVAL    
 HX HERNIA REPAIR No Known Allergies Vital signs:   
Visit Vitals /60 (BP 1 Location: Left arm, BP Patient Position: Sitting) Pulse (!) 59 Temp 96.7 °F (35.9 °C) Resp 18 Ht 6' (1.829 m) Wt 93 kg (205 lb) SpO2 96% BMI 27.80 kg/m² Review of Systems:  
GENERAL: Denies fever, reports fatigue CARDIAC: No CP or SOB PULMONARY: No cough of SOB MUSCULOSKELETAL: No new joint pain NEURO: SEE HPI 
 
 
EXAM: Alert, in NAD. Heart is regular. Oriented x3, EOM's are full, PERRL, no facial asymmetries. Strength and tone are normal. DTR's +2, gait symmetric Assessment/Plan: He has REM specific obstructive sleep apnea, problem is likely underestimated given the significant desaturations to 84% and a total sleep time of 3.6 hours, underscoring his problems of insufficient sleep compounding the problem. I discussed the sleep study findings, complications of obstructive sleep apnea, treatment options, weight loss, supine sleep avoidance. He will return for a CPAP titration trial, as he is motivated to get this treated. I will see him after this is done. PLEASE NOTE:  
Portions of this document may have been produced using voice recognition software. Unrecognized errors in transcription may be present. This note will not be viewable in [address] no chest pain and no edema.

## (undated) DEVICE — GLIDESHEATH SLENDER STAINLESS STEEL KIT: Brand: GLIDESHEATH SLENDER

## (undated) DEVICE — CATHETER ANGIO 4FR L100CM S STL NYL JL4 3 SEG BRAID SFT

## (undated) DEVICE — PROCEDURE KIT FLUID MGMT 10 FR CUST MAINFOLD

## (undated) DEVICE — CATHETER ANGIO 4FR L100CM S STL NYL JL5 3 SEG BRAID SFT

## (undated) DEVICE — CATHETER ANGIO 4FR L100CM COR NYL AR MOD W/O SIDE H RADPQ

## (undated) DEVICE — DRAPE,ANGIO,BRACH,STERILE,38X44: Brand: MEDLINE

## (undated) DEVICE — PACK PROCEDURE SURG VASC CATH 161 MMC LF

## (undated) DEVICE — INTRODUCER SHTH 4FR CANN L11CM DIL TIP 25MM RED TUNGSTEN

## (undated) DEVICE — SET FLD ADMIN 3 W STPCOCK FIX FEM L BOR 1IN

## (undated) DEVICE — PRESSURE MONITORING SET: Brand: TRUWAVE

## (undated) DEVICE — COVER US PRB W15XL120CM W/ GEL RUBBERBAND TAPE STRP FLD GEN